# Patient Record
Sex: MALE | Race: WHITE | Employment: UNEMPLOYED | URBAN - METROPOLITAN AREA
[De-identification: names, ages, dates, MRNs, and addresses within clinical notes are randomized per-mention and may not be internally consistent; named-entity substitution may affect disease eponyms.]

---

## 2017-05-25 ENCOUNTER — OFFICE VISIT (OUTPATIENT)
Dept: FAMILY MEDICINE CLINIC | Age: 62
End: 2017-05-25

## 2017-05-25 VITALS
SYSTOLIC BLOOD PRESSURE: 120 MMHG | DIASTOLIC BLOOD PRESSURE: 80 MMHG | HEART RATE: 87 BPM | WEIGHT: 188.9 LBS | BODY MASS INDEX: 27.04 KG/M2 | HEIGHT: 70 IN | OXYGEN SATURATION: 97 %

## 2017-05-25 DIAGNOSIS — R89.9 ABNORMAL LABORATORY TEST: ICD-10-CM

## 2017-05-25 DIAGNOSIS — Z01.818 PRE-OP EXAM: Primary | ICD-10-CM

## 2017-05-25 DIAGNOSIS — H02.403 ACQUIRED PTOSIS OF BOTH EYELIDS: ICD-10-CM

## 2017-05-25 PROCEDURE — 99244 OFF/OP CNSLTJ NEW/EST MOD 40: CPT | Performed by: FAMILY MEDICINE

## 2017-05-26 LAB
ALBUMIN SERPL-MCNC: 4.2 G/DL (ref 3.4–5)
ALP BLD-CCNC: 83 U/L (ref 40–129)
ALT SERPL-CCNC: 27 U/L (ref 10–40)
AST SERPL-CCNC: 33 U/L (ref 15–37)
BILIRUB SERPL-MCNC: <0.2 MG/DL (ref 0–1)
BILIRUBIN DIRECT: <0.2 MG/DL (ref 0–0.3)
BILIRUBIN, INDIRECT: NORMAL MG/DL (ref 0–1)
CHOLESTEROL, TOTAL: 190 MG/DL (ref 0–199)
HDLC SERPL-MCNC: 65 MG/DL (ref 40–60)
LDL CHOLESTEROL CALCULATED: 109 MG/DL
TOTAL PROTEIN: 7.3 G/DL (ref 6.4–8.2)
TRIGL SERPL-MCNC: 81 MG/DL (ref 0–150)
VLDLC SERPL CALC-MCNC: 16 MG/DL

## 2018-01-15 ENCOUNTER — OFFICE VISIT (OUTPATIENT)
Dept: FAMILY MEDICINE CLINIC | Age: 63
End: 2018-01-15

## 2018-01-15 VITALS
OXYGEN SATURATION: 98 % | SYSTOLIC BLOOD PRESSURE: 120 MMHG | BODY MASS INDEX: 28.43 KG/M2 | HEIGHT: 70 IN | WEIGHT: 198.6 LBS | DIASTOLIC BLOOD PRESSURE: 80 MMHG | HEART RATE: 92 BPM

## 2018-01-15 DIAGNOSIS — M25.541 ARTHRALGIA OF BOTH HANDS: ICD-10-CM

## 2018-01-15 DIAGNOSIS — M25.542 ARTHRALGIA OF BOTH HANDS: ICD-10-CM

## 2018-01-15 PROBLEM — Z01.818 PRE-OP EXAM: Status: RESOLVED | Noted: 2017-05-25 | Resolved: 2018-01-15

## 2018-01-15 PROCEDURE — 99213 OFFICE O/P EST LOW 20 MIN: CPT | Performed by: FAMILY MEDICINE

## 2018-01-15 RX ORDER — GLUCOSAMINE/CHONDR SU A SOD 750-600 MG
TABLET ORAL
COMMUNITY
End: 2021-01-13 | Stop reason: ALTCHOICE

## 2018-01-15 ASSESSMENT — PATIENT HEALTH QUESTIONNAIRE - PHQ9
SUM OF ALL RESPONSES TO PHQ9 QUESTIONS 1 & 2: 0
2. FEELING DOWN, DEPRESSED OR HOPELESS: 0
1. LITTLE INTEREST OR PLEASURE IN DOING THINGS: 0
SUM OF ALL RESPONSES TO PHQ QUESTIONS 1-9: 0

## 2018-01-15 NOTE — PROGRESS NOTES
Subjective:      Patient ID: Nando Valdes is a 60 y.o. male. Hand Pain    The incident occurred more than 1 week ago. The injury mechanism was repetitive motion. The pain is present in the right fingers and left fingers. The quality of the pain is described as aching. The pain does not radiate. The pain is mild. The pain has been fluctuating since the incident. Pertinent negatives include no chest pain, muscle weakness, numbness or tingling. Associated symptoms comments: +FH of SLE  . Nothing aggravates the symptoms. He has tried nothing for the symptoms. The treatment provided no relief. Review of Systems   Cardiovascular: Negative for chest pain. Musculoskeletal: Positive for arthralgias (foot pain too). Neurological: Negative for tingling and numbness. Objective:   Physical Exam   Constitutional: He is oriented to person, place, and time. He appears well-developed and well-nourished. No distress. HENT:   Mouth/Throat: Oropharynx is clear and moist.   Eyes: Conjunctivae are normal. Pupils are equal, round, and reactive to light. Neck: No JVD present. No tracheal deviation present. No thyromegaly present. Cardiovascular: Normal rate, regular rhythm, normal heart sounds and intact distal pulses. Exam reveals no gallop. No murmur heard. Pulmonary/Chest: Effort normal and breath sounds normal. No stridor. No respiratory distress. He has no wheezes. He has no rales. He exhibits no tenderness. Abdominal: Soft. Bowel sounds are normal. He exhibits no distension and no mass. There is no tenderness. Musculoskeletal: He exhibits no edema or tenderness. Lymphadenopathy:     He has no cervical adenopathy. Neurological: He is alert and oriented to person, place, and time. He displays normal reflexes. No cranial nerve deficit. He exhibits normal muscle tone. Coordination normal.   Skin: Skin is warm and dry. No rash noted. No erythema. No pallor.    Psychiatric: He has a normal mood and affect. His behavior is normal. Judgment and thought content normal.   Vitals reviewed.       Assessment:      Arthralgia of both hands  Will get labs          Plan:      Above will be done at [facility]

## 2018-03-20 ENCOUNTER — OFFICE VISIT (OUTPATIENT)
Dept: FAMILY MEDICINE CLINIC | Age: 63
End: 2018-03-20

## 2018-03-20 VITALS
HEART RATE: 81 BPM | SYSTOLIC BLOOD PRESSURE: 120 MMHG | DIASTOLIC BLOOD PRESSURE: 80 MMHG | BODY MASS INDEX: 27.93 KG/M2 | OXYGEN SATURATION: 95 % | WEIGHT: 195.1 LBS | HEIGHT: 70 IN

## 2018-03-20 DIAGNOSIS — Z00.00 WELL ADULT EXAM: Primary | ICD-10-CM

## 2018-03-20 DIAGNOSIS — M25.542 ARTHRALGIA OF BOTH HANDS: ICD-10-CM

## 2018-03-20 DIAGNOSIS — M25.541 ARTHRALGIA OF BOTH HANDS: ICD-10-CM

## 2018-03-20 DIAGNOSIS — E55.9 VITAMIN D DEFICIENCY: ICD-10-CM

## 2018-03-20 DIAGNOSIS — Z23 NEED FOR DIPHTHERIA-TETANUS-PERTUSSIS (TDAP) VACCINE: ICD-10-CM

## 2018-03-20 PROCEDURE — 99396 PREV VISIT EST AGE 40-64: CPT | Performed by: FAMILY MEDICINE

## 2018-03-20 PROCEDURE — 90471 IMMUNIZATION ADMIN: CPT | Performed by: FAMILY MEDICINE

## 2018-03-20 PROCEDURE — 90715 TDAP VACCINE 7 YRS/> IM: CPT | Performed by: FAMILY MEDICINE

## 2018-03-20 NOTE — PROGRESS NOTES
daily.      aspirin 81 MG tablet Take 81 mg by mouth daily.  vitamin E 1000 UNITS capsule Take 1,000 Units by mouth daily.  Cholecalciferol (VITAMIN D-3) 5000 UNITS TABS Take  by mouth. Past Medical History:   Diagnosis Date    Hip dysplasia      Past Surgical History:   Procedure Laterality Date    CATARACT REMOVAL Bilateral 2006    COLONOSCOPY  2004,2014    HIP SURGERY      JOINT REPLACEMENT Right 2009    hip replacement    RETINAL DETACHMENT SURGERY Bilateral     x2     Family History   Problem Relation Age of Onset    Other Mother      SLE    Heart Disease Father     High Blood Pressure Brother     Stroke Brother     Other Brother      pheochromocytoma     Social History     Social History    Marital status:      Spouse name: N/A    Number of children: 2    Years of education: N/A     Occupational History    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 Physical Exam:   Vitals:    03/20/18 1341   BP: 120/80   Pulse: 81   SpO2: 95%   Weight: 195 lb 1.6 oz (88.5 kg)   Height: 5' 10.08\" (1.78 m)     Body mass index is 27.93 kg/m². Constitutional: He is oriented to person, place, and time. He appears well-developed and well-nourished. No distress. HEENT:   Head: Normocephalic and atraumatic.    Right Ear: Tympanic membrane, external ear and ear canal normal.   Left Ear: Tympanic membrane, external ear and ear canal normal.   Nose: Nose normal. Mouth/Throat: Oropharynx is clear and moist and mucous membranes are normal. No oropharyngeal exudate or posterior oropharyngeal erythema. He has no cervical adenopathy. Eyes: Conjunctivae and extraocular motions are normal. Pupils are equal, round, and reactive to light. Neck: Full passive range of motion without pain. Neck supple. No JVD present. Carotid bruit is not present. No mass and no thyromegaly present. Cardiovascular: Normal rate, regular rhythm, normal heart sounds and intact distal pulses. Exam reveals no gallop and no friction rub. No murmur heard. Pulmonary/Chest: Effort normal and breath sounds normal. No respiratory distress. He has no wheezes, rhonchi or rales. Abdominal: Soft, non-tender. Bowel sounds and aorta are normal. There is no organomegaly, mass or bruit. Genitourinary:  genitals normal without hernia or inguinal adenopathy, rectal normal, no masses, prostate normal in size without masses or nodules, is  circumsized, no inguinal lymphadenopathy. Musculoskeletal: Normal range of motion, no synovitis. He exhibits no edema. Neurological: He is alert and oriented to person, place, and time. He has normal reflexes. No cranial nerve deficit. Coordination normal.   Skin: Skin is warm, dry and intact. No suspicious lesions are noted. Psychiatric: He has a normal mood and affect.  His speech is normal and behavior is normal. Judgment, cognition and memory are normal.     Assessment/Plan:    Well adult exam  Labs--tdap    Arthralgia of both hands  NAIDA,RF

## 2018-03-22 DIAGNOSIS — M25.542 ARTHRALGIA OF BOTH HANDS: ICD-10-CM

## 2018-03-22 DIAGNOSIS — M25.541 ARTHRALGIA OF BOTH HANDS: ICD-10-CM

## 2018-03-22 LAB
BASOPHILS ABSOLUTE: 0 K/UL (ref 0–0.2)
BASOPHILS RELATIVE PERCENT: 0.3 %
EOSINOPHILS ABSOLUTE: 0.4 K/UL (ref 0–0.6)
EOSINOPHILS RELATIVE PERCENT: 6 %
HCT VFR BLD CALC: 43.4 % (ref 40.5–52.5)
HEMOGLOBIN: 15.1 G/DL (ref 13.5–17.5)
LYMPHOCYTES ABSOLUTE: 2.4 K/UL (ref 1–5.1)
LYMPHOCYTES RELATIVE PERCENT: 33.1 %
MCH RBC QN AUTO: 30.5 PG (ref 26–34)
MCHC RBC AUTO-ENTMCNC: 34.7 G/DL (ref 31–36)
MCV RBC AUTO: 87.8 FL (ref 80–100)
MONOCYTES ABSOLUTE: 0.8 K/UL (ref 0–1.3)
MONOCYTES RELATIVE PERCENT: 11.3 %
NEUTROPHILS ABSOLUTE: 3.6 K/UL (ref 1.7–7.7)
NEUTROPHILS RELATIVE PERCENT: 49.3 %
PDW BLD-RTO: 14.1 % (ref 12.4–15.4)
PLATELET # BLD: 234 K/UL (ref 135–450)
PMV BLD AUTO: 9 FL (ref 5–10.5)
RBC # BLD: 4.94 M/UL (ref 4.2–5.9)
WBC # BLD: 7.3 K/UL (ref 4–11)

## 2018-03-23 LAB
A/G RATIO: 1.6 (ref 1.1–2.2)
ALBUMIN SERPL-MCNC: 4.7 G/DL (ref 3.4–5)
ALP BLD-CCNC: 73 U/L (ref 40–129)
ALT SERPL-CCNC: 36 U/L (ref 10–40)
ANA INTERPRETATION: ABNORMAL
ANA PATTERN: ABNORMAL
ANA TITER: ABNORMAL
ANION GAP SERPL CALCULATED.3IONS-SCNC: 20 MMOL/L (ref 3–16)
ANTI-NUCLEAR ANTIBODY (ANA): POSITIVE
AST SERPL-CCNC: 36 U/L (ref 15–37)
BILIRUB SERPL-MCNC: 0.5 MG/DL (ref 0–1)
BUN BLDV-MCNC: 23 MG/DL (ref 7–20)
CALCIUM SERPL-MCNC: 9.3 MG/DL (ref 8.3–10.6)
CHLORIDE BLD-SCNC: 100 MMOL/L (ref 99–110)
CHOLESTEROL, TOTAL: 188 MG/DL (ref 0–199)
CO2: 23 MMOL/L (ref 21–32)
CREAT SERPL-MCNC: 1 MG/DL (ref 0.8–1.3)
GFR AFRICAN AMERICAN: >60
GFR NON-AFRICAN AMERICAN: >60
GLOBULIN: 3 G/DL
GLUCOSE BLD-MCNC: 98 MG/DL (ref 70–99)
HDLC SERPL-MCNC: 68 MG/DL (ref 40–60)
LDL CHOLESTEROL CALCULATED: 106 MG/DL
POTASSIUM SERPL-SCNC: 4.8 MMOL/L (ref 3.5–5.1)
RHEUMATOID FACTOR: <10 IU/ML
SODIUM BLD-SCNC: 143 MMOL/L (ref 136–145)
TOTAL PROTEIN: 7.7 G/DL (ref 6.4–8.2)
TRIGL SERPL-MCNC: 69 MG/DL (ref 0–150)
TSH SERPL DL<=0.05 MIU/L-ACNC: 3.36 UIU/ML (ref 0.27–4.2)
VITAMIN D 25-HYDROXY: 60.1 NG/ML
VLDLC SERPL CALC-MCNC: 14 MG/DL

## 2018-09-26 ENCOUNTER — TELEPHONE (OUTPATIENT)
Dept: FAMILY MEDICINE CLINIC | Age: 63
End: 2018-09-26

## 2018-09-26 DIAGNOSIS — M25.541 ARTHRALGIA OF BOTH HANDS: Primary | ICD-10-CM

## 2018-09-26 DIAGNOSIS — M25.542 ARTHRALGIA OF BOTH HANDS: Primary | ICD-10-CM

## 2018-09-27 LAB
ANA INTERPRETATION: ABNORMAL
ANA PATTERN: ABNORMAL
ANA TITER: ABNORMAL
ANTI-NUCLEAR ANTIBODY (ANA): POSITIVE

## 2019-08-19 ENCOUNTER — OFFICE VISIT (OUTPATIENT)
Dept: FAMILY MEDICINE CLINIC | Age: 64
End: 2019-08-19
Payer: COMMERCIAL

## 2019-08-19 VITALS
OXYGEN SATURATION: 98 % | HEART RATE: 91 BPM | BODY MASS INDEX: 27.63 KG/M2 | WEIGHT: 193 LBS | DIASTOLIC BLOOD PRESSURE: 90 MMHG | SYSTOLIC BLOOD PRESSURE: 136 MMHG | HEIGHT: 70 IN

## 2019-08-19 DIAGNOSIS — R03.0 ELEVATED BLOOD PRESSURE READING: ICD-10-CM

## 2019-08-19 DIAGNOSIS — Z00.00 WELL ADULT EXAM: Primary | ICD-10-CM

## 2019-08-19 DIAGNOSIS — Z23 NEED FOR PNEUMOCOCCAL VACCINE: ICD-10-CM

## 2019-08-19 DIAGNOSIS — E55.9 VITAMIN D DEFICIENCY: ICD-10-CM

## 2019-08-19 PROCEDURE — 90670 PCV13 VACCINE IM: CPT | Performed by: FAMILY MEDICINE

## 2019-08-19 PROCEDURE — 99396 PREV VISIT EST AGE 40-64: CPT | Performed by: FAMILY MEDICINE

## 2019-08-19 PROCEDURE — 90471 IMMUNIZATION ADMIN: CPT | Performed by: FAMILY MEDICINE

## 2019-08-19 ASSESSMENT — PATIENT HEALTH QUESTIONNAIRE - PHQ9
SUM OF ALL RESPONSES TO PHQ QUESTIONS 1-9: 0
SUM OF ALL RESPONSES TO PHQ9 QUESTIONS 1 & 2: 0
SUM OF ALL RESPONSES TO PHQ QUESTIONS 1-9: 0
1. LITTLE INTEREST OR PLEASURE IN DOING THINGS: 0
2. FEELING DOWN, DEPRESSED OR HOPELESS: 0

## 2019-08-19 NOTE — PROGRESS NOTES
History and Physical      Mary Dumont  YOB: 1955    Date of Service:  8/19/2019    Chief Complaint:   Carolyn Landon is a 58 y.o. male who presents for complete physical examination. HPI: cpx no cc    Wt Readings from Last 3 Encounters:   08/19/19 193 lb (87.5 kg)   03/20/18 195 lb 1.6 oz (88.5 kg)   01/15/18 198 lb 9.6 oz (90.1 kg)     BP Readings from Last 3 Encounters:   08/19/19 (!) 136/90   03/20/18 120/80   01/15/18 120/80       Patient Active Problem List   Diagnosis    Neck pain on left side    Elevated blood pressure reading    Acquired ptosis of both eyelids    Arthralgia of both hands       Preventive Care:  Health Maintenance   Topic Date Due    Shingles Vaccine (2 of 3) 01/28/2015    Flu vaccine (1) 09/01/2019    Lipid screen  03/22/2023    Colon cancer screen colonoscopy  06/10/2024    DTaP/Tdap/Td vaccine (3 - Td) 03/20/2028    Hepatitis C screen  Completed    Pneumococcal 0-64 years Vaccine  Aged Out    HIV screen  Discontinued      Self-testicular exams: Yes  Sexual activity: single partner, contraception - none   Last eye exam: 2018, normal  Exercise: aerobics 4 time(s) per week  Seatbelt use: +  Lipid panel:    Lab Results   Component Value Date    CHOL 188 03/22/2018    TRIG 69 03/22/2018    HDL 68 (H) 03/22/2018    LDLCALC 106 (H) 03/22/2018       Living will: yes,   copy on file    Immunization History   Administered Date(s) Administered    Influenza Virus Vaccine 11/12/2014    Tdap (Boostrix, Adacel) 09/26/2008, 03/20/2018    Zoster Live (Zostavax) 12/03/2014       No Known Allergies  Outpatient Medications Marked as Taking for the 8/19/19 encounter (Office Visit) with Deanna White MD   Medication Sig Dispense Refill    TURMERIC CURCUMIN PO Take by mouth      Ascorbic Acid (VITAMIN C) 500 MG tablet Take 500 mg by mouth daily      multivitamin (THERAGRAN) per tablet Take 1 tablet by mouth daily.       vitamin E 1000 UNITS capsule Take 1,000

## 2019-08-23 DIAGNOSIS — Z00.00 WELL ADULT EXAM: ICD-10-CM

## 2019-08-23 DIAGNOSIS — E55.9 VITAMIN D DEFICIENCY: ICD-10-CM

## 2019-08-23 DIAGNOSIS — R73.9 HYPERGLYCEMIA: Primary | ICD-10-CM

## 2019-08-23 LAB
BASOPHILS ABSOLUTE: 0 K/UL (ref 0–0.2)
BASOPHILS RELATIVE PERCENT: 0.4 %
EOSINOPHILS ABSOLUTE: 0.3 K/UL (ref 0–0.6)
EOSINOPHILS RELATIVE PERCENT: 5.6 %
HCT VFR BLD CALC: 43.1 % (ref 40.5–52.5)
HEMOGLOBIN: 14.6 G/DL (ref 13.5–17.5)
LYMPHOCYTES ABSOLUTE: 1.9 K/UL (ref 1–5.1)
LYMPHOCYTES RELATIVE PERCENT: 31.5 %
MCH RBC QN AUTO: 29.9 PG (ref 26–34)
MCHC RBC AUTO-ENTMCNC: 33.9 G/DL (ref 31–36)
MCV RBC AUTO: 88.1 FL (ref 80–100)
MONOCYTES ABSOLUTE: 0.7 K/UL (ref 0–1.3)
MONOCYTES RELATIVE PERCENT: 11.3 %
NEUTROPHILS ABSOLUTE: 3.1 K/UL (ref 1.7–7.7)
NEUTROPHILS RELATIVE PERCENT: 51.2 %
PDW BLD-RTO: 14 % (ref 12.4–15.4)
PLATELET # BLD: 213 K/UL (ref 135–450)
PMV BLD AUTO: 8.6 FL (ref 5–10.5)
RBC # BLD: 4.89 M/UL (ref 4.2–5.9)
WBC # BLD: 6 K/UL (ref 4–11)

## 2019-08-24 LAB
A/G RATIO: 1.3 (ref 1.1–2.2)
ALBUMIN SERPL-MCNC: 4.1 G/DL (ref 3.4–5)
ALP BLD-CCNC: 82 U/L (ref 40–129)
ALT SERPL-CCNC: 26 U/L (ref 10–40)
ANION GAP SERPL CALCULATED.3IONS-SCNC: 15 MMOL/L (ref 3–16)
AST SERPL-CCNC: 29 U/L (ref 15–37)
BILIRUB SERPL-MCNC: 0.3 MG/DL (ref 0–1)
BUN BLDV-MCNC: 25 MG/DL (ref 7–20)
CALCIUM SERPL-MCNC: 9.6 MG/DL (ref 8.3–10.6)
CHLORIDE BLD-SCNC: 102 MMOL/L (ref 99–110)
CHOLESTEROL, TOTAL: 191 MG/DL (ref 0–199)
CO2: 24 MMOL/L (ref 21–32)
CREAT SERPL-MCNC: 1.1 MG/DL (ref 0.8–1.3)
GFR AFRICAN AMERICAN: >60
GFR NON-AFRICAN AMERICAN: >60
GLOBULIN: 3.2 G/DL
GLUCOSE BLD-MCNC: 107 MG/DL (ref 70–99)
HDLC SERPL-MCNC: 55 MG/DL (ref 40–60)
LDL CHOLESTEROL CALCULATED: 119 MG/DL
POTASSIUM SERPL-SCNC: 4.8 MMOL/L (ref 3.5–5.1)
SODIUM BLD-SCNC: 141 MMOL/L (ref 136–145)
TOTAL PROTEIN: 7.3 G/DL (ref 6.4–8.2)
TRIGL SERPL-MCNC: 85 MG/DL (ref 0–150)
TSH SERPL DL<=0.05 MIU/L-ACNC: 2.87 UIU/ML (ref 0.27–4.2)
VITAMIN D 25-HYDROXY: 51.8 NG/ML
VLDLC SERPL CALC-MCNC: 17 MG/DL

## 2019-08-27 DIAGNOSIS — R73.9 HYPERGLYCEMIA: ICD-10-CM

## 2019-08-27 LAB
ESTIMATED AVERAGE GLUCOSE: 114 MG/DL
HBA1C MFR BLD: 5.6 %

## 2019-09-05 ENCOUNTER — OFFICE VISIT (OUTPATIENT)
Dept: FAMILY MEDICINE CLINIC | Age: 64
End: 2019-09-05
Payer: COMMERCIAL

## 2019-09-05 VITALS — SYSTOLIC BLOOD PRESSURE: 140 MMHG | DIASTOLIC BLOOD PRESSURE: 90 MMHG | OXYGEN SATURATION: 99 % | HEART RATE: 66 BPM

## 2019-09-05 DIAGNOSIS — R03.0 ELEVATED BLOOD PRESSURE READING: ICD-10-CM

## 2019-09-05 PROCEDURE — 99213 OFFICE O/P EST LOW 20 MIN: CPT | Performed by: FAMILY MEDICINE

## 2019-09-05 PROCEDURE — G8419 CALC BMI OUT NRM PARAM NOF/U: HCPCS | Performed by: FAMILY MEDICINE

## 2019-09-05 PROCEDURE — G8427 DOCREV CUR MEDS BY ELIG CLIN: HCPCS | Performed by: FAMILY MEDICINE

## 2019-09-05 PROCEDURE — 3017F COLORECTAL CA SCREEN DOC REV: CPT | Performed by: FAMILY MEDICINE

## 2019-09-05 PROCEDURE — 1036F TOBACCO NON-USER: CPT | Performed by: FAMILY MEDICINE

## 2019-09-05 ASSESSMENT — ENCOUNTER SYMPTOMS
ORTHOPNEA: 0
BLURRED VISION: 0
SHORTNESS OF BREATH: 0

## 2019-09-05 NOTE — PROGRESS NOTES
History:   Procedure Laterality Date    CATARACT REMOVAL Bilateral 2006    COLONOSCOPY  2004,2014    HIP SURGERY      JOINT REPLACEMENT Right 2009    hip replacement    RETINAL DETACHMENT SURGERY Bilateral     x2       Social History     Socioeconomic History    Marital status:      Spouse name: Not on file    Number of children: 2    Years of education: Not on file    Highest education level: Not on file   Occupational History    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 [employer]   Social Needs    Financial resource strain: Not on file    Food insecurity:     Worry: Not on file     Inability: Not on file    Transportation needs:     Medical: Not on file     Non-medical: Not on file   Tobacco Use    Smoking status: Never Smoker    Smokeless tobacco: Never Used   Substance and Sexual Activity    Alcohol use:  Yes     Alcohol/week: 2.0 standard drinks     Types: 1 Glasses of wine, 10 Cans of beer per week    Drug use: No    Sexual activity: Yes     Partners: Female   Lifestyle    Physical activity:     Days per week: Not on file     Minutes per session: Not on file    Stress: Not on file   Relationships    Social connections:     Talks on phone: Not on file     Gets together: Not on file     Attends Pentecostal service: Not on file     Active member of club or organization: Not on file     Attends meetings of clubs or organizations: Not on file     Relationship status: Not on file    Intimate partner violence:     Fear of current or ex partner: Not on file     Emotionally abused: Not on file     Physically abused: Not on file     Forced sexual activity: Not on file   Other Topics Concern    Not on file   Social History Narrative    Retired--does patient play     remarried and doing well--kids close(has grandchildren)    Exercise daily and golfs       Family History   Problem Relation Age of Onset    Other Mother         SLE    Lupus Mother     Heart Disease Father     High Blood Pressure Brother     Stroke Brother     Other Brother         pheochromocytoma       Immunization History   Administered Date(s) Administered    Influenza Virus Vaccine 11/12/2014    Pneumococcal Conjugate 13-valent (Murtazablake Simmonsing) 08/19/2019    Tdap (Boostrix, Adacel) 09/26/2008, 03/20/2018    Zoster Live (Zostavax) 12/03/2014       Review of Systems  Review of Systems   Constitutional: Negative for malaise/fatigue. Eyes: Negative for blurred vision. Respiratory: Negative for shortness of breath. Cardiovascular: Negative for chest pain, palpitations, orthopnea and PND. Musculoskeletal: Negative for neck pain. Neurological: Negative for headaches. Objective:   Physical Exam  Physical Exam   Constitutional: He is oriented to person, place, and time. He appears well-developed and well-nourished. No distress. HENT:   Head: Normocephalic and atraumatic. Right Ear: External ear normal.   Left Ear: External ear normal.   Nose: Nose normal.   Mouth/Throat: Oropharynx is clear and moist. No oropharyngeal exudate. Eyes: Pupils are equal, round, and reactive to light. Conjunctivae and EOM are normal. Right eye exhibits no discharge. Left eye exhibits no discharge. No scleral icterus. Neck: Normal range of motion. Neck supple. No JVD present. No tracheal deviation present. No thyromegaly present. Cardiovascular: Normal rate, regular rhythm, normal heart sounds and intact distal pulses. Exam reveals no gallop. No murmur heard. Pulmonary/Chest: Effort normal and breath sounds normal. No respiratory distress. He has no wheezes. He has no rales. He exhibits no tenderness. Abdominal: Soft. Bowel sounds are normal. He exhibits no distension and no mass. There is no tenderness. There is no rebound and no guarding. Genitourinary: Rectum normal, prostate normal and penis normal. Rectal exam shows guaiac negative stool. No penile tenderness. Musculoskeletal: He exhibits no edema or tenderness.

## 2019-11-01 ENCOUNTER — TELEPHONE (OUTPATIENT)
Dept: FAMILY MEDICINE CLINIC | Age: 64
End: 2019-11-01

## 2019-11-01 DIAGNOSIS — R73.9 HYPERGLYCEMIA: Primary | ICD-10-CM

## 2019-11-12 DIAGNOSIS — R73.9 HYPERGLYCEMIA: ICD-10-CM

## 2019-11-12 LAB
ANION GAP SERPL CALCULATED.3IONS-SCNC: 16 MMOL/L (ref 3–16)
BUN BLDV-MCNC: 22 MG/DL (ref 7–20)
CALCIUM SERPL-MCNC: 9.2 MG/DL (ref 8.3–10.6)
CHLORIDE BLD-SCNC: 101 MMOL/L (ref 99–110)
CO2: 23 MMOL/L (ref 21–32)
CREAT SERPL-MCNC: 1.1 MG/DL (ref 0.8–1.3)
GFR AFRICAN AMERICAN: >60
GFR NON-AFRICAN AMERICAN: >60
GLUCOSE BLD-MCNC: 108 MG/DL (ref 70–99)
POTASSIUM SERPL-SCNC: 4.5 MMOL/L (ref 3.5–5.1)
SODIUM BLD-SCNC: 140 MMOL/L (ref 136–145)

## 2019-11-13 LAB
ESTIMATED AVERAGE GLUCOSE: 111.2 MG/DL
HBA1C MFR BLD: 5.5 %

## 2019-12-18 ENCOUNTER — APPOINTMENT (RX ONLY)
Dept: URBAN - METROPOLITAN AREA CLINIC 170 | Facility: CLINIC | Age: 64
Setting detail: DERMATOLOGY
End: 2019-12-18

## 2019-12-18 DIAGNOSIS — L663 OTHER SPECIFIED DISEASES OF HAIR AND HAIR FOLLICLES: ICD-10-CM

## 2019-12-18 DIAGNOSIS — L738 OTHER SPECIFIED DISEASES OF HAIR AND HAIR FOLLICLES: ICD-10-CM

## 2019-12-18 DIAGNOSIS — L73.9 FOLLICULAR DISORDER, UNSPECIFIED: ICD-10-CM

## 2019-12-18 PROBLEM — L02.12 FURUNCLE OF NECK: Status: ACTIVE | Noted: 2019-12-18

## 2019-12-18 PROCEDURE — ? ADDITIONAL NOTES

## 2019-12-18 PROCEDURE — ? COUNSELING

## 2019-12-18 PROCEDURE — ? PRESCRIPTION

## 2019-12-18 PROCEDURE — 99213 OFFICE O/P EST LOW 20 MIN: CPT

## 2019-12-18 RX ORDER — CLINDAMYCIN PHOSPHATE 10 MG/G
GEL TOPICAL
Qty: 1 | Refills: 2 | Status: ERX | COMMUNITY
Start: 2019-12-18

## 2019-12-18 RX ADMIN — CLINDAMYCIN PHOSPHATE ONE: 10 GEL TOPICAL at 00:00

## 2019-12-18 ASSESSMENT — LOCATION DETAILED DESCRIPTION DERM: LOCATION DETAILED: MID POSTERIOR NECK

## 2019-12-18 ASSESSMENT — LOCATION ZONE DERM: LOCATION ZONE: NECK

## 2019-12-18 ASSESSMENT — LOCATION SIMPLE DESCRIPTION DERM: LOCATION SIMPLE: POSTERIOR NECK

## 2019-12-18 NOTE — HPI: SKIN LESIONS
How Severe Is Your Skin Lesion?: mild
Have Your Skin Lesions Been Treated?: not been treated
Is This A New Presentation, Or A Follow-Up?: Growth
Additional History: Eval lump in the back of the neck, it has been there for a few months and only gets irritated when he picks it.

## 2020-10-29 ENCOUNTER — APPOINTMENT (RX ONLY)
Dept: URBAN - METROPOLITAN AREA CLINIC 170 | Facility: CLINIC | Age: 65
Setting detail: DERMATOLOGY
End: 2020-10-29

## 2020-10-29 DIAGNOSIS — L738 OTHER SPECIFIED DISEASES OF HAIR AND HAIR FOLLICLES: ICD-10-CM

## 2020-10-29 DIAGNOSIS — L81.4 OTHER MELANIN HYPERPIGMENTATION: ICD-10-CM

## 2020-10-29 DIAGNOSIS — D22 MELANOCYTIC NEVI: ICD-10-CM

## 2020-10-29 DIAGNOSIS — L82.1 OTHER SEBORRHEIC KERATOSIS: ICD-10-CM

## 2020-10-29 DIAGNOSIS — L663 OTHER SPECIFIED DISEASES OF HAIR AND HAIR FOLLICLES: ICD-10-CM

## 2020-10-29 DIAGNOSIS — L73.9 FOLLICULAR DISORDER, UNSPECIFIED: ICD-10-CM

## 2020-10-29 DIAGNOSIS — D18.0 HEMANGIOMA: ICD-10-CM

## 2020-10-29 DIAGNOSIS — L57.0 ACTINIC KERATOSIS: ICD-10-CM

## 2020-10-29 PROBLEM — D22.5 MELANOCYTIC NEVI OF TRUNK: Status: ACTIVE | Noted: 2020-10-29

## 2020-10-29 PROBLEM — L02.821 FURUNCLE OF HEAD [ANY PART, EXCEPT FACE]: Status: ACTIVE | Noted: 2020-10-29

## 2020-10-29 PROBLEM — D18.01 HEMANGIOMA OF SKIN AND SUBCUTANEOUS TISSUE: Status: ACTIVE | Noted: 2020-10-29

## 2020-10-29 PROCEDURE — 17000 DESTRUCT PREMALG LESION: CPT

## 2020-10-29 PROCEDURE — ? FULL BODY SKIN EXAM

## 2020-10-29 PROCEDURE — ? SUNSCREEN RECOMMENDATIONS

## 2020-10-29 PROCEDURE — ? PRESCRIPTION MEDICATION MANAGEMENT

## 2020-10-29 PROCEDURE — ? LIQUID NITROGEN

## 2020-10-29 PROCEDURE — ? ADDITIONAL NOTES

## 2020-10-29 PROCEDURE — ? COUNSELING

## 2020-10-29 PROCEDURE — 99214 OFFICE O/P EST MOD 30 MIN: CPT | Mod: 25

## 2020-10-29 ASSESSMENT — LOCATION SIMPLE DESCRIPTION DERM
LOCATION SIMPLE: RIGHT LOWER BACK
LOCATION SIMPLE: UPPER BACK
LOCATION SIMPLE: POSTERIOR SCALP
LOCATION SIMPLE: RIGHT SHOULDER
LOCATION SIMPLE: ABDOMEN
LOCATION SIMPLE: LEFT FOREHEAD

## 2020-10-29 ASSESSMENT — LOCATION DETAILED DESCRIPTION DERM
LOCATION DETAILED: LEFT INFERIOR MEDIAL FOREHEAD
LOCATION DETAILED: RIGHT ANTERIOR SHOULDER
LOCATION DETAILED: RIGHT INFERIOR MEDIAL MIDBACK
LOCATION DETAILED: MID-OCCIPITAL SCALP
LOCATION DETAILED: EPIGASTRIC SKIN
LOCATION DETAILED: INFERIOR THORACIC SPINE

## 2020-10-29 ASSESSMENT — LOCATION ZONE DERM
LOCATION ZONE: ARM
LOCATION ZONE: FACE
LOCATION ZONE: SCALP
LOCATION ZONE: TRUNK

## 2020-10-29 NOTE — HPI: EVALUATION OF SKIN LESION(S)
What Type Of Note Output Would You Prefer (Optional)?: Bullet Format
Hpi Title: Evaluation of Skin Lesions
How Severe Are Your Spot(S)?: mild
Have Your Spot(S) Been Treated In The Past?: has not been treated
Additional History: Patient states one area on the back of the neck but it’s been there for years. Used Clindamycin gel once daily for a few months, never got the refill, still on the first bottle.

## 2020-10-29 NOTE — PROCEDURE: PRESCRIPTION MEDICATION MANAGEMENT
Detail Level: Detailed
Initiate Treatment: Get RF and restart Clindamycin gel 1% BID
Render In Strict Bullet Format?: No

## 2020-10-29 NOTE — PROCEDURE: ADDITIONAL NOTES
Detail Level: Simple
Additional Notes: Patient consent was obtained to proceed with the visit and recommended plan of care after discussion of all risks and benefits, including the risks of COVID-19 exposure.
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 Pt instructed to stop picking.
Detail Level: Detailed

## 2020-12-17 ENCOUNTER — OFFICE VISIT (OUTPATIENT)
Dept: PRIMARY CARE CLINIC | Age: 65
End: 2020-12-17
Payer: COMMERCIAL

## 2020-12-17 PROCEDURE — G8421 BMI NOT CALCULATED: HCPCS | Performed by: NURSE PRACTITIONER

## 2020-12-17 PROCEDURE — 99211 OFF/OP EST MAY X REQ PHY/QHP: CPT | Performed by: NURSE PRACTITIONER

## 2020-12-17 PROCEDURE — G8428 CUR MEDS NOT DOCUMENT: HCPCS | Performed by: NURSE PRACTITIONER

## 2020-12-17 NOTE — PROGRESS NOTES
Xochitl Yañez 59 received a viral test for COVID-19. They were educated on isolation and quarantine as appropriate. For any symptoms, they were directed to seek care from their PCP, given contact information to establish with a doctor, directed to an urgent care or the emergency room.

## 2020-12-18 ENCOUNTER — TELEPHONE (OUTPATIENT)
Dept: FAMILY MEDICINE CLINIC | Age: 65
End: 2020-12-18

## 2020-12-18 LAB — SARS-COV-2: DETECTED

## 2021-01-13 ENCOUNTER — OFFICE VISIT (OUTPATIENT)
Dept: FAMILY MEDICINE CLINIC | Age: 66
End: 2021-01-13
Payer: COMMERCIAL

## 2021-01-13 VITALS
BODY MASS INDEX: 27.63 KG/M2 | WEIGHT: 193 LBS | OXYGEN SATURATION: 98 % | HEIGHT: 70 IN | TEMPERATURE: 98.2 F | HEART RATE: 94 BPM | SYSTOLIC BLOOD PRESSURE: 120 MMHG | DIASTOLIC BLOOD PRESSURE: 80 MMHG

## 2021-01-13 DIAGNOSIS — M17.10 ARTHRITIS OF KNEE: ICD-10-CM

## 2021-01-13 DIAGNOSIS — U07.1 COVID-19 VIRUS INFECTION: Primary | ICD-10-CM

## 2021-01-13 DIAGNOSIS — R03.0 ELEVATED BLOOD PRESSURE READING: ICD-10-CM

## 2021-01-13 DIAGNOSIS — Z00.00 WELL ADULT EXAM: ICD-10-CM

## 2021-01-13 LAB
A/G RATIO: 1.2 (ref 1.1–2.2)
ALBUMIN SERPL-MCNC: 4.1 G/DL (ref 3.4–5)
ALP BLD-CCNC: 81 U/L (ref 40–129)
ALT SERPL-CCNC: 24 U/L (ref 10–40)
ANION GAP SERPL CALCULATED.3IONS-SCNC: 9 MMOL/L (ref 3–16)
AST SERPL-CCNC: 27 U/L (ref 15–37)
BASOPHILS ABSOLUTE: 0 K/UL (ref 0–0.2)
BASOPHILS RELATIVE PERCENT: 0.3 %
BILIRUB SERPL-MCNC: 0.4 MG/DL (ref 0–1)
BUN BLDV-MCNC: 18 MG/DL (ref 7–20)
CALCIUM SERPL-MCNC: 9.6 MG/DL (ref 8.3–10.6)
CHLORIDE BLD-SCNC: 101 MMOL/L (ref 99–110)
CHOLESTEROL, TOTAL: 196 MG/DL (ref 0–199)
CO2: 28 MMOL/L (ref 21–32)
CREAT SERPL-MCNC: 1 MG/DL (ref 0.8–1.3)
EOSINOPHILS ABSOLUTE: 0.1 K/UL (ref 0–0.6)
EOSINOPHILS RELATIVE PERCENT: 2.3 %
GFR AFRICAN AMERICAN: >60
GFR NON-AFRICAN AMERICAN: >60
GLOBULIN: 3.3 G/DL
GLUCOSE BLD-MCNC: 99 MG/DL (ref 70–99)
HCT VFR BLD CALC: 42.2 % (ref 40.5–52.5)
HDLC SERPL-MCNC: 61 MG/DL (ref 40–60)
HEMOGLOBIN: 14 G/DL (ref 13.5–17.5)
LDL CHOLESTEROL CALCULATED: 127 MG/DL
LYMPHOCYTES ABSOLUTE: 1.7 K/UL (ref 1–5.1)
LYMPHOCYTES RELATIVE PERCENT: 35.6 %
MCH RBC QN AUTO: 29.3 PG (ref 26–34)
MCHC RBC AUTO-ENTMCNC: 33.3 G/DL (ref 31–36)
MCV RBC AUTO: 88 FL (ref 80–100)
MONOCYTES ABSOLUTE: 0.6 K/UL (ref 0–1.3)
MONOCYTES RELATIVE PERCENT: 13 %
NEUTROPHILS ABSOLUTE: 2.3 K/UL (ref 1.7–7.7)
NEUTROPHILS RELATIVE PERCENT: 48.8 %
PDW BLD-RTO: 14 % (ref 12.4–15.4)
PLATELET # BLD: 232 K/UL (ref 135–450)
PMV BLD AUTO: 9.1 FL (ref 5–10.5)
POTASSIUM SERPL-SCNC: 4.4 MMOL/L (ref 3.5–5.1)
RBC # BLD: 4.79 M/UL (ref 4.2–5.9)
SODIUM BLD-SCNC: 138 MMOL/L (ref 136–145)
TOTAL PROTEIN: 7.4 G/DL (ref 6.4–8.2)
TRIGL SERPL-MCNC: 42 MG/DL (ref 0–150)
VLDLC SERPL CALC-MCNC: 8 MG/DL
WBC # BLD: 4.7 K/UL (ref 4–11)

## 2021-01-13 PROCEDURE — 1036F TOBACCO NON-USER: CPT | Performed by: FAMILY MEDICINE

## 2021-01-13 PROCEDURE — G8484 FLU IMMUNIZE NO ADMIN: HCPCS | Performed by: FAMILY MEDICINE

## 2021-01-13 PROCEDURE — 99213 OFFICE O/P EST LOW 20 MIN: CPT | Performed by: FAMILY MEDICINE

## 2021-01-13 PROCEDURE — 3017F COLORECTAL CA SCREEN DOC REV: CPT | Performed by: FAMILY MEDICINE

## 2021-01-13 PROCEDURE — G8427 DOCREV CUR MEDS BY ELIG CLIN: HCPCS | Performed by: FAMILY MEDICINE

## 2021-01-13 PROCEDURE — 4040F PNEUMOC VAC/ADMIN/RCVD: CPT | Performed by: FAMILY MEDICINE

## 2021-01-13 PROCEDURE — 1123F ACP DISCUSS/DSCN MKR DOCD: CPT | Performed by: FAMILY MEDICINE

## 2021-01-13 PROCEDURE — G8417 CALC BMI ABV UP PARAM F/U: HCPCS | Performed by: FAMILY MEDICINE

## 2021-01-13 SDOH — ECONOMIC STABILITY: FOOD INSECURITY: WITHIN THE PAST 12 MONTHS, YOU WORRIED THAT YOUR FOOD WOULD RUN OUT BEFORE YOU GOT MONEY TO BUY MORE.: NEVER TRUE

## 2021-01-13 ASSESSMENT — PATIENT HEALTH QUESTIONNAIRE - PHQ9
SUM OF ALL RESPONSES TO PHQ9 QUESTIONS 1 & 2: 0
1. LITTLE INTEREST OR PLEASURE IN DOING THINGS: 0
2. FEELING DOWN, DEPRESSED OR HOPELESS: 0
SUM OF ALL RESPONSES TO PHQ QUESTIONS 1-9: 0

## 2021-01-13 NOTE — PROGRESS NOTES
Subjective:     Patient ID:Patrick Escobedo is a 76 y.o. male. Knee Pain   The incident occurred more than 1 week ago. The incident occurred at home. There was no injury mechanism. The pain is present in the right knee. The quality of the pain is described as aching. The pain is mild. The pain has been intermittent since onset. Pertinent negatives include no inability to bear weight, loss of motion, loss of sensation, muscle weakness, numbness or tingling. He reports no foreign bodies present. Nothing aggravates the symptoms. He has tried nothing for the symptoms. The treatment provided moderate relief. No Known Allergies    Current Outpatient Medications   Medication Sig Dispense Refill    TURMERIC CURCUMIN PO Take by mouth      KRILL OIL PO Take by mouth      Ascorbic Acid (VITAMIN C) 500 MG tablet Take 1,000 mg by mouth daily       multivitamin (THERAGRAN) per tablet Take 1 tablet by mouth daily.  vitamin E 1000 UNITS capsule Take 1,000 Units by mouth daily.  Cholecalciferol (VITAMIN D-3) 5000 UNITS TABS Take  by mouth. No current facility-administered medications for this visit.         Past Medical History:   Diagnosis Date    COVID-19 virus infection 1/13/2021    Hip dysplasia        Past Surgical History:   Procedure Laterality Date    CATARACT REMOVAL Bilateral 2006    COLONOSCOPY  [id number]    HIP SURGERY      JOINT REPLACEMENT Right 2009    hip replacement    RETINAL DETACHMENT SURGERY Bilateral     x2       Social History     Socioeconomic History    Marital status:      Spouse name: Not on file    Number of children: 2    Years of education: Not on file    Highest education level: Not on file   Occupational History    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 [employer]   Social Needs    Financial resource strain: Not hard at all   [place] insecurity     Worry: Never true     Inability: Never true   [employer] needs     Medical: No     Non-medical: No Tobacco Use    Smoking status: Never Smoker    Smokeless tobacco: Never Used   Substance and Sexual Activity    Alcohol use: Yes     Alcohol/week: 2.0 standard drinks     Types: 1 Glasses of wine, 10 Cans of beer per week    Drug use: No    Sexual activity: Yes     Partners: Female   Lifestyle    Physical activity     Days per week: Not on file     Minutes per session: Not on file    Stress: Not on file   Relationships    Social connections     Talks on phone: Not on file     Gets together: Not on file     Attends Orthodox service: Not on file     Active member of club or organization: Not on file     Attends meetings of clubs or organizations: Not on file     Relationship status: Not on file    Intimate partner violence     Fear of current or ex partner: Not on file     Emotionally abused: Not on file     Physically abused: Not on file     Forced sexual activity: Not on file   Other Topics Concern    Not on file   Social History Narrative    Retired--does patient play     remarried and doing well--kids close(has grandchildren)    Exercise daily and golfs       Family History   Problem Relation Age of Onset    Other Mother         SLE    Lupus Mother     Heart Disease Father     High Blood Pressure Brother     Stroke Brother     Other Brother         pheochromocytoma       Immunization History   Administered Date(s) Administered    Influenza Virus Vaccine 11/12/2014    Influenza, Quadv, adjuvanted, 65 yrs +, IM, PF (Fluad) 10/07/2020    Pneumococcal Conjugate 13-valent ([id number]) 08/19/2019    Pneumococcal Polysaccharide ([id number]) 10/07/2020    Tdap (Boostrix, Adacel) 09/26/2008, 03/20/2018    Zoster Live (Zostavax) 12/03/2014    Zoster Recombinant (Shingrix) 10/07/2020       Review of Systems  Review of Systems   Neurological: Negative for tingling and numbness. Objective:   Physical Exam  Physical Exam  Vitals signs reviewed.    Constitutional:

## 2021-02-24 ENCOUNTER — IMMUNIZATION (OUTPATIENT)
Dept: PRIMARY CARE CLINIC | Age: 66
End: 2021-02-24
Payer: COMMERCIAL

## 2021-02-24 PROCEDURE — 0011A COVID-19, MODERNA VACCINE 100MCG/0.5ML DOSE: CPT | Performed by: FAMILY MEDICINE

## 2021-02-24 PROCEDURE — 91301 COVID-19, MODERNA VACCINE 100MCG/0.5ML DOSE: CPT | Performed by: FAMILY MEDICINE

## 2021-03-24 ENCOUNTER — IMMUNIZATION (OUTPATIENT)
Dept: PRIMARY CARE CLINIC | Age: 66
End: 2021-03-24
Payer: COMMERCIAL

## 2021-03-24 PROCEDURE — 0012A PR IMM ADMN SARSCOV2 100 MCG/0.5 ML 2ND DOSE: CPT | Performed by: FAMILY MEDICINE

## 2021-03-24 PROCEDURE — 91301 COVID-19, MODERNA VACCINE 100MCG/0.5ML DOSE: CPT | Performed by: FAMILY MEDICINE

## 2021-05-05 ENCOUNTER — RX ONLY (OUTPATIENT)
Age: 66
Setting detail: RX ONLY
End: 2021-05-05

## 2021-05-05 RX ORDER — CLINDAMYCIN PHOSPHATE 10 MG/G
GEL TOPICAL
Qty: 1 | Refills: 1 | Status: ERX

## 2021-05-24 ENCOUNTER — CLINICAL DOCUMENTATION (OUTPATIENT)
Dept: FAMILY MEDICINE CLINIC | Age: 66
End: 2021-05-24

## 2021-09-08 ENCOUNTER — APPOINTMENT (RX ONLY)
Dept: URBAN - METROPOLITAN AREA CLINIC 170 | Facility: CLINIC | Age: 66
Setting detail: DERMATOLOGY
End: 2021-09-08

## 2021-09-08 DIAGNOSIS — L57.0 ACTINIC KERATOSIS: ICD-10-CM | Status: INADEQUATELY CONTROLLED

## 2021-09-08 DIAGNOSIS — D18.0 HEMANGIOMA: ICD-10-CM

## 2021-09-08 DIAGNOSIS — D22 MELANOCYTIC NEVI: ICD-10-CM

## 2021-09-08 DIAGNOSIS — L81.4 OTHER MELANIN HYPERPIGMENTATION: ICD-10-CM

## 2021-09-08 DIAGNOSIS — B07.8 OTHER VIRAL WARTS: ICD-10-CM | Status: INADEQUATELY CONTROLLED

## 2021-09-08 DIAGNOSIS — L82.0 INFLAMED SEBORRHEIC KERATOSIS: ICD-10-CM | Status: INADEQUATELY CONTROLLED

## 2021-09-08 DIAGNOSIS — L82.1 OTHER SEBORRHEIC KERATOSIS: ICD-10-CM

## 2021-09-08 DIAGNOSIS — L28.1 PRURIGO NODULARIS: ICD-10-CM

## 2021-09-08 PROBLEM — D18.01 HEMANGIOMA OF SKIN AND SUBCUTANEOUS TISSUE: Status: ACTIVE | Noted: 2021-09-08

## 2021-09-08 PROBLEM — D22.5 MELANOCYTIC NEVI OF TRUNK: Status: ACTIVE | Noted: 2021-09-08

## 2021-09-08 PROCEDURE — ? LIQUID NITROGEN

## 2021-09-08 PROCEDURE — ? FULL BODY SKIN EXAM

## 2021-09-08 PROCEDURE — 17110 DESTRUCTION B9 LES UP TO 14: CPT

## 2021-09-08 PROCEDURE — 17000 DESTRUCT PREMALG LESION: CPT | Mod: 59

## 2021-09-08 PROCEDURE — ? TREATMENT REGIMEN

## 2021-09-08 PROCEDURE — ? COUNSELING

## 2021-09-08 PROCEDURE — 99213 OFFICE O/P EST LOW 20 MIN: CPT | Mod: 25

## 2021-09-08 PROCEDURE — ? ADDITIONAL NOTES

## 2021-09-08 PROCEDURE — 17003 DESTRUCT PREMALG LES 2-14: CPT | Mod: 59

## 2021-09-08 ASSESSMENT — LOCATION DETAILED DESCRIPTION DERM
LOCATION DETAILED: RIGHT PLANTAR FOREFOOT OVERLYING 2ND METATARSAL
LOCATION DETAILED: LEFT ANTERIOR PROXIMAL THIGH
LOCATION DETAILED: RIGHT LATERAL SUPERIOR CHEST
LOCATION DETAILED: LEFT MEDIAL BREAST 10-11:00 REGION
LOCATION DETAILED: MIDDLE STERNUM
LOCATION DETAILED: HAIR
LOCATION DETAILED: STERNAL NOTCH
LOCATION DETAILED: GLABELLA
LOCATION DETAILED: RIGHT CENTRAL MALAR CHEEK

## 2021-09-08 ASSESSMENT — LOCATION ZONE DERM
LOCATION ZONE: FACE
LOCATION ZONE: LEG
LOCATION ZONE: TRUNK
LOCATION ZONE: FEET
LOCATION ZONE: SCALP

## 2021-09-08 ASSESSMENT — LOCATION SIMPLE DESCRIPTION DERM
LOCATION SIMPLE: LEFT THIGH
LOCATION SIMPLE: RIGHT PLANTAR SURFACE
LOCATION SIMPLE: GLABELLA
LOCATION SIMPLE: HAIR
LOCATION SIMPLE: CHEST
LOCATION SIMPLE: LEFT BREAST
LOCATION SIMPLE: RIGHT CHEEK

## 2021-09-08 NOTE — PROCEDURE: LIQUID NITROGEN
Duration Of Freeze Thaw-Cycle (Seconds): 0
Post-Care Instructions: I reviewed with the patient in detail post-care instructions. Patient is to wear sunprotection, and avoid picking at any of the treated lesions. Pt may apply Vaseline to crusted or scabbing areas.
Show Aperture Variable?: Yes
Consent: The patient's consent was obtained including but not limited to risks of crusting, scabbing, blistering, scarring, darker or lighter pigmentary change, recurrence, incomplete removal and infection.
Render Post-Care Instructions In Note?: no
Detail Level: Detailed
Number Of Freeze-Thaw Cycles: 1 freeze-thaw cycle
Medical Necessity Information: It is in your best interest to select a reason for this procedure from the list below. All of these items fulfill various CMS LCD requirements except the new and changing color options.
Medical Necessity Clause: This procedure was medically necessary because the lesions that were treated were:
Duration Of Freeze Thaw-Cycle (Seconds): 10-15

## 2021-09-30 ENCOUNTER — OFFICE VISIT (OUTPATIENT)
Dept: FAMILY MEDICINE CLINIC | Age: 66
End: 2021-09-30
Payer: COMMERCIAL

## 2021-09-30 VITALS
TEMPERATURE: 97.3 F | BODY MASS INDEX: 27.26 KG/M2 | DIASTOLIC BLOOD PRESSURE: 82 MMHG | HEART RATE: 65 BPM | OXYGEN SATURATION: 97 % | SYSTOLIC BLOOD PRESSURE: 139 MMHG | WEIGHT: 190 LBS

## 2021-09-30 DIAGNOSIS — Z23 FLU VACCINE NEED: ICD-10-CM

## 2021-09-30 DIAGNOSIS — R03.0 ELEVATED BLOOD PRESSURE READING: ICD-10-CM

## 2021-09-30 DIAGNOSIS — M54.2 NECK PAIN ON LEFT SIDE: Primary | ICD-10-CM

## 2021-09-30 PROCEDURE — 1036F TOBACCO NON-USER: CPT | Performed by: FAMILY MEDICINE

## 2021-09-30 PROCEDURE — 1123F ACP DISCUSS/DSCN MKR DOCD: CPT | Performed by: FAMILY MEDICINE

## 2021-09-30 PROCEDURE — 99213 OFFICE O/P EST LOW 20 MIN: CPT | Performed by: FAMILY MEDICINE

## 2021-09-30 PROCEDURE — 4040F PNEUMOC VAC/ADMIN/RCVD: CPT | Performed by: FAMILY MEDICINE

## 2021-09-30 PROCEDURE — G8427 DOCREV CUR MEDS BY ELIG CLIN: HCPCS | Performed by: FAMILY MEDICINE

## 2021-09-30 PROCEDURE — 3017F COLORECTAL CA SCREEN DOC REV: CPT | Performed by: FAMILY MEDICINE

## 2021-09-30 PROCEDURE — 90471 IMMUNIZATION ADMIN: CPT | Performed by: FAMILY MEDICINE

## 2021-09-30 PROCEDURE — G8417 CALC BMI ABV UP PARAM F/U: HCPCS | Performed by: FAMILY MEDICINE

## 2021-09-30 PROCEDURE — 90694 VACC AIIV4 NO PRSRV 0.5ML IM: CPT | Performed by: FAMILY MEDICINE

## 2021-09-30 ASSESSMENT — ENCOUNTER SYMPTOMS
VISUAL CHANGE: 0
PHOTOPHOBIA: 0
TROUBLE SWALLOWING: 0

## 2021-09-30 NOTE — PROGRESS NOTES
Subjective:     Patient ID:Patrick Carrasco is a 78 y.o. male. Neck Pain   This is a recurrent problem. The current episode started more than 1 year ago. The problem has been unchanged. The pain is associated with nothing. The quality of the pain is described as aching. The pain is moderate. Nothing aggravates the symptoms. The pain is same all the time. Pertinent negatives include no chest pain, fever, headaches, leg pain, numbness, pain with swallowing, paresis, photophobia, syncope, tingling, trouble swallowing, visual change, weakness or weight loss. He has tried nothing for the symptoms. The treatment provided moderate relief. --BP was up at [facility] exams--at home they are OK    No Known Allergies    Current Outpatient Medications   Medication Sig Dispense Refill    TURMERIC CURCUMIN PO Take by mouth      KRILL OIL PO Take by mouth      Ascorbic Acid (VITAMIN C) 500 MG tablet Take 1,000 mg by mouth daily       multivitamin (THERAGRAN) per tablet Take 1 tablet by mouth daily.  vitamin E 1000 UNITS capsule Take 1,000 Units by mouth daily.  Cholecalciferol (VITAMIN D-3) 5000 UNITS TABS Take  by mouth. No current facility-administered medications for this visit.        Past Medical History:   Diagnosis Date    COVID-19 virus infection 1/13/2021    Hip dysplasia        Past Surgical History:   Procedure Laterality Date    CATARACT REMOVAL Bilateral 2006    COLONOSCOPY  [id number]    HIP SURGERY      JOINT REPLACEMENT Right 2009    hip replacement    RETINAL DETACHMENT SURGERY Bilateral     x2       Social History     Socioeconomic History    Marital status:      Spouse name: Not on file    Number of children: 2    Years of education: Not on file    Highest education level: Not on file   Occupational History    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 [email or web address]   Tobacco Use    Smoking status: Never Smoker    Smokeless tobacco: Never Used   Vaping Use    Vaping Use: Never used   Substance and Sexual Activity    Alcohol use: Yes     Alcohol/week: 2.0 standard drinks     Types: 1 Glasses of wine, 10 Cans of beer per week    Drug use: No    Sexual activity: Yes     Partners: Female   Other Topics Concern    Not on file   Social History Narrative    Retired--does patient play     remarried and doing well--kids close(has grandchildren)    Exercise daily and golfs     Social Determinants of Health     Financial Resource Strain: Low Risk     Difficulty of Paying Living Expenses: Not hard at all   Food Insecurity: No Food Insecurity    Worried About Running Out of Food in the Last Year: Never true    Christy of Food in the Last Year: Never true   Transportation Needs: No Transportation Needs    Lack of Transportation (Medical): No    Lack of Transportation (Non-Medical):  No   Physical Activity:     Days of Exercise per Week:     Minutes of Exercise per Session:    Stress:     Feeling of Stress :    Social Connections:     Frequency of Communication with Friends and Family:     Frequency of Social Gatherings with Friends and Family:     Attends Synagogue Services:     Active Member of Clubs or Organizations:     Attends Club or Organization Meetings:     Marital Status:    Intimate Partner Violence:     Fear of Current or Ex-Partner:     Emotionally Abused:     Physically Abused:     Sexually Abused:        Family History   Problem Relation Age of Onset    Other Mother         SLE    Lupus Mother     Heart Disease Father     High Blood Pressure Brother     Stroke Brother     Other Brother         pheochromocytoma       Immunization History   Administered Date(s) Administered    COVID-19, Moderna, PF, 100mcg/0.5mL 02/24/2021, 03/24/2021    Influenza Virus Vaccine 11/12/2014    Influenza, Quadv, adjuvanted, 65 yrs +, IM, PF (Fluad) 10/07/2020    Pneumococcal Conjugate 13-valent ([id number]) 08/19/2019    Pneumococcal Polysaccharide ([id number]) 10/07/2020    content normal.         Judgment: Judgment normal.       No visits with results within 1 Month(s) from this visit.    Latest known visit with results is:   Orders Only on 01/13/2021   Component Date Value Ref Range Status    WBC 01/13/2021 4.7  4.0 - 11.0 K/uL Final    RBC 01/13/2021 4.79  4.20 - 5.90 M/uL Final    Hemoglobin 01/13/2021 14.0  13.5 - 17.5 g/dL Final    Hematocrit 01/13/2021 42.2  40.5 - 52.5 % Final    MCV 01/13/2021 88.0  80.0 - 100.0 fL Final    MCH 01/13/2021 29.3  26.0 - 34.0 pg Final    MCHC 01/13/2021 33.3  31.0 - 36.0 g/dL Final    RDW 01/13/2021 14.0  12.4 - 15.4 % Final    Platelets 79/42/2740 232  135 - 450 K/uL Final    MPV 01/13/2021 9.1  5.0 - 10.5 fL Final    Neutrophils % 01/13/2021 48.8  % Final    Lymphocytes % 01/13/2021 35.6  % Final    Monocytes % 01/13/2021 13.0  % Final    Eosinophils % 01/13/2021 2.3  % Final    Basophils % 01/13/2021 0.3  % Final    Neutrophils Absolute 01/13/2021 2.3  1.7 - 7.7 K/uL Final    Lymphocytes Absolute 01/13/2021 1.7  1.0 - 5.1 K/uL Final    Monocytes Absolute 01/13/2021 0.6  0.0 - 1.3 K/uL Final    Eosinophils Absolute 01/13/2021 0.1  0.0 - 0.6 K/uL Final    Basophils Absolute 01/13/2021 0.0  0.0 - 0.2 K/uL Final    Sodium 01/13/2021 138  136 - 145 mmol/L Final    Potassium 01/13/2021 4.4  3.5 - 5.1 mmol/L Final    Chloride 01/13/2021 101  99 - 110 mmol/L Final    CO2 01/13/2021 28  21 - 32 mmol/L Final    Anion Gap 01/13/2021 9  3 - 16 Final    Glucose 01/13/2021 99  70 - 99 mg/dL Final    BUN 01/13/2021 18  7 - 20 mg/dL Final    CREATININE 01/13/2021 1.0  0.8 - 1.3 mg/dL Final    GFR Non- 01/13/2021 >60  >60 Final    GFR  01/13/2021 >60  >60 Final    Calcium 01/13/2021 9.6  8.3 - 10.6 mg/dL Final    Total Protein 01/13/2021 7.4  6.4 - 8.2 g/dL Final    Albumin 01/13/2021 4.1  3.4 - 5.0 g/dL Final    Albumin/Globulin Ratio 01/13/2021 1.2  1.1 - 2.2 Final    Total Bilirubin 01/13/2021 0.4  0.0 - 1.0 mg/dL Final    Alkaline Phosphatase 01/13/2021 81  40 - 129 U/L Final    ALT 01/13/2021 24  10 - 40 U/L Final    AST 01/13/2021 27  15 - 37 U/L Final    Globulin 01/13/2021 3.3  g/dL Final    Cholesterol, Total 01/13/2021 196  0 - 199 mg/dL Final    Triglycerides 01/13/2021 42  0 - 150 mg/dL Final    HDL 01/13/2021 61* 40 - 60 mg/dL Final    LDL Calculated 01/13/2021 127* <100 mg/dL Final    VLDL Cholesterol Calculated 01/13/2021 8  Not Established mg/dL Final       Assessment and Plan:     Elevated blood pressure reading   Unclear control, lifestyle modifications recommended--watch salt and alcohol    Neck pain on left side   stretches

## 2021-12-06 ENCOUNTER — HOSPITAL ENCOUNTER (OUTPATIENT)
Dept: GENERAL RADIOLOGY | Age: 66
Discharge: HOME | End: 2021-12-06
Payer: COMMERCIAL

## 2021-12-06 ENCOUNTER — OFFICE VISIT (OUTPATIENT)
Dept: FAMILY MEDICINE CLINIC | Age: 66
End: 2021-12-06
Payer: COMMERCIAL

## 2021-12-06 ENCOUNTER — HOSPITAL ENCOUNTER (OUTPATIENT)
Age: 66
Discharge: HOME | End: 2021-12-06
Payer: COMMERCIAL

## 2021-12-06 VITALS
HEART RATE: 98 BPM | WEIGHT: 193.2 LBS | HEIGHT: 70 IN | OXYGEN SATURATION: 97 % | DIASTOLIC BLOOD PRESSURE: 84 MMHG | SYSTOLIC BLOOD PRESSURE: 138 MMHG | BODY MASS INDEX: 27.66 KG/M2

## 2021-12-06 DIAGNOSIS — M54.40 BACK PAIN OF LUMBAR REGION WITH SCIATICA: ICD-10-CM

## 2021-12-06 DIAGNOSIS — R03.0 ELEVATED BLOOD PRESSURE READING: ICD-10-CM

## 2021-12-06 DIAGNOSIS — M54.40 BACK PAIN OF LUMBAR REGION WITH SCIATICA: Primary | ICD-10-CM

## 2021-12-06 PROCEDURE — 4040F PNEUMOC VAC/ADMIN/RCVD: CPT | Performed by: FAMILY MEDICINE

## 2021-12-06 PROCEDURE — G8417 CALC BMI ABV UP PARAM F/U: HCPCS | Performed by: FAMILY MEDICINE

## 2021-12-06 PROCEDURE — 3017F COLORECTAL CA SCREEN DOC REV: CPT | Performed by: FAMILY MEDICINE

## 2021-12-06 PROCEDURE — 1036F TOBACCO NON-USER: CPT | Performed by: FAMILY MEDICINE

## 2021-12-06 PROCEDURE — G8427 DOCREV CUR MEDS BY ELIG CLIN: HCPCS | Performed by: FAMILY MEDICINE

## 2021-12-06 PROCEDURE — G8484 FLU IMMUNIZE NO ADMIN: HCPCS | Performed by: FAMILY MEDICINE

## 2021-12-06 PROCEDURE — 1123F ACP DISCUSS/DSCN MKR DOCD: CPT | Performed by: FAMILY MEDICINE

## 2021-12-06 PROCEDURE — 99213 OFFICE O/P EST LOW 20 MIN: CPT | Performed by: FAMILY MEDICINE

## 2021-12-06 PROCEDURE — 72100 X-RAY EXAM L-S SPINE 2/3 VWS: CPT

## 2021-12-06 ASSESSMENT — PATIENT HEALTH QUESTIONNAIRE - PHQ9
SUM OF ALL RESPONSES TO PHQ QUESTIONS 1-9: 0
1. LITTLE INTEREST OR PLEASURE IN DOING THINGS: 0
SUM OF ALL RESPONSES TO PHQ QUESTIONS 1-9: 0
2. FEELING DOWN, DEPRESSED OR HOPELESS: 0
SUM OF ALL RESPONSES TO PHQ QUESTIONS 1-9: 0
SUM OF ALL RESPONSES TO PHQ9 QUESTIONS 1 & 2: 0

## 2021-12-06 ASSESSMENT — ENCOUNTER SYMPTOMS
BLURRED VISION: 0
ORTHOPNEA: 0
SHORTNESS OF BREATH: 0

## 2021-12-06 NOTE — PROGRESS NOTES
Subjective:     Patient ID:Patrick Brown is a 78 y.o. male. Hypertension  This is a recurrent problem. The current episode started more than 1 month ago. The problem is unchanged. The problem is uncontrolled. Pertinent negatives include no anxiety, blurred vision, chest pain, headaches, malaise/fatigue, neck pain, orthopnea, palpitations, peripheral edema, PND, shortness of breath or sweats. There are no associated agents to hypertension. There are no known risk factors for coronary artery disease. Past treatments include lifestyle changes. The current treatment provides no improvement. There are no compliance problems. There is no history of angina, kidney disease, CAD/MI, CVA, heart failure, left ventricular hypertrophy, PVD or retinopathy. There is no history of chronic renal disease, coarctation of the aorta, hyperaldosteronism, hypercortisolism, hyperparathyroidism, a hypertension causing med, pheochromocytoma, renovascular disease, sleep apnea or a thyroid problem. No Known Allergies    Current Outpatient Medications   Medication Sig Dispense Refill    TURMERIC CURCUMIN PO Take by mouth      KRILL OIL PO Take by mouth      Ascorbic Acid (VITAMIN C) 500 MG tablet Take 1,000 mg by mouth daily       multivitamin (THERAGRAN) per tablet Take 1 tablet by mouth daily.  vitamin E 1000 UNITS capsule Take 1,000 Units by mouth daily.  Cholecalciferol (VITAMIN D-3) 5000 UNITS TABS Take  by mouth. No current facility-administered medications for this visit.        Past Medical History:   Diagnosis Date    COVID-19 virus infection 1/13/2021    Hip dysplasia        Past Surgical History:   Procedure Laterality Date    CATARACT REMOVAL Bilateral 2006    COLONOSCOPY  [id number]    HIP SURGERY      JOINT REPLACEMENT Right 2009    hip replacement    RETINAL DETACHMENT SURGERY Bilateral     x2       Social History     Socioeconomic History    Marital status:      Spouse name: Not on file    Number of children: 2    Years of education: Not on file    Highest education level: Not on file   Occupational History    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 [employer]   Tobacco Use    Smoking status: Never Smoker    Smokeless tobacco: Never Used   Vaping Use    Vaping Use: Never used   Substance and Sexual Activity    Alcohol use: Yes     Alcohol/week: 2.0 standard drinks     Types: 1 Glasses of wine, 10 Cans of beer per week    Drug use: No    Sexual activity: Yes     Partners: Female   Other Topics Concern    Not on file   Social History Narrative    Retired--does patient play     remarried and doing well--kids close(has grandchildren)    Exercise daily and golfs     Social Determinants of Health     Financial Resource Strain: Low Risk     Difficulty of Paying Living Expenses: Not hard at all   Food Insecurity: No Food Insecurity    Worried About Running Out of Food in the Last Year: Never true    Christy of Food in the Last Year: Never true   Transportation Needs: No Transportation Needs    Lack of Transportation (Medical): No    Lack of Transportation (Non-Medical):  No   Physical Activity:     Days of Exercise per Week: Not on file    Minutes of Exercise per Session: Not on file   Stress:     Feeling of Stress : Not on file   Social Connections:     Frequency of Communication with Friends and Family: Not on file    Frequency of Social Gatherings with Friends and Family: Not on file    Attends Sabianism Services: Not on file    Active Member of Clubs or Organizations: Not on file    Attends Club or Organization Meetings: Not on file    Marital Status: Not on file   Intimate Partner Violence:     Fear of Current or Ex-Partner: Not on file    Emotionally Abused: Not on file    Physically Abused: Not on file    Sexually Abused: Not on file   Housing Stability:     Unable to Pay for Housing in the Last Year: Not on file    Number of [place] in the Last Year: Not on file  Unstable Housing in the Last Year: Not on file       Family History   Problem Relation Age of Onset    Other Mother         SLE    Lupus Mother     Heart Disease Father     High Blood Pressure Brother     Stroke Brother     Other Brother         pheochromocytoma       Immunization History   Administered Date(s) Administered    KENJIBrynnjonkusum Arn, Primary or Immunocompromised, PF, 100mcg/0.5mL 02/24/2021, 03/24/2021    Influenza Virus Vaccine 11/12/2014    Influenza, Quadv, adjuvanted, 65 yrs +, IM, PF (Fluad) 10/07/2020, 09/30/2021    Pneumococcal Conjugate 13-valent ([id number]) 08/19/2019    Pneumococcal Polysaccharide ([id number]) 10/07/2020    Tdap (Boostrix, Adacel) 09/26/2008, 03/20/2018    Zoster Live (Zostavax) 12/03/2014    Zoster Recombinant (Shingrix) 10/07/2020, 02/02/2021       Review of Systems  Review of Systems   Constitutional: Negative for malaise/fatigue. Eyes: Negative for blurred vision. Respiratory: Negative for shortness of breath. Cardiovascular: Negative for chest pain, palpitations, orthopnea and PND. Musculoskeletal: Negative for neck pain. Neurological: Negative for headaches. reviewed all his numbers    Objective:   Physical Exam  Physical Exam  Vitals reviewed. Constitutional:       General: He is not in acute distress. Appearance: He is well-developed. Eyes:      Conjunctiva/sclera: Conjunctivae normal.      Pupils: Pupils are equal, round, and reactive to light. Neck:      Thyroid: No thyromegaly. Vascular: No JVD. Trachea: No tracheal deviation. Cardiovascular:      Rate and Rhythm: Normal rate and regular rhythm. Heart sounds: Normal heart sounds. No murmur heard. No gallop. Comments: 148/84([id number]/90(his)  Pulmonary:      Effort: Pulmonary effort is normal. No respiratory distress. Breath sounds: Normal breath sounds. No stridor. No wheezing or rales. Chest:      Chest wall: No tenderness.    Abdominal: General: Bowel sounds are normal. There is no distension. Palpations: Abdomen is soft. There is no mass. Tenderness: There is no abdominal tenderness. Musculoskeletal:         General: No tenderness. Lymphadenopathy:      Cervical: No cervical adenopathy. Skin:     General: Skin is warm and dry. Coloration: Skin is not pale. Findings: No erythema or rash. Neurological:      Mental Status: He is alert and oriented to person, place, and time. Cranial Nerves: No cranial nerve deficit. Motor: No abnormal muscle tone. Coordination: Coordination normal.      Deep Tendon Reflexes: Reflexes normal.   Psychiatric:         Behavior: Behavior normal.         Thought Content: Thought content normal.         Judgment: Judgment normal.         Assessment and Plan:     Elevated blood pressure reading   Borderline controlled, continue current medications and lifestyle modifications recommended    Back pain of lumbar region with sciatica   Uncontrolled, changes made today: get films--?  PT  --check BP 3x weekly

## 2021-12-09 ENCOUNTER — HOSPITAL ENCOUNTER (OUTPATIENT)
Dept: PHYSICAL THERAPY | Age: 66
Setting detail: THERAPIES SERIES
Discharge: HOME | End: 2021-12-09
Payer: COMMERCIAL

## 2021-12-09 PROCEDURE — 97110 THERAPEUTIC EXERCISES: CPT

## 2021-12-09 PROCEDURE — 97161 PT EVAL LOW COMPLEX 20 MIN: CPT

## 2021-12-09 NOTE — PLAN OF CARE
[facility]  [address], [place] [address], [address]  Phone: [phone]   Fax: [phone]        Physical Therapy Certification    Dear Referring Practitioner: Deann Fallon MD,    We had the pleasure of evaluating the following patient for physical therapy services at [facility]. A summary of our findings can be found in the initial assessment below. This includes our plan of care. If you have any questions or concerns regarding these findings, please do not hesitate to contact me at the office phone number checked above. Thank you for the referral.       Physician Signature:_______________________________Date:__________________  By signing above (or electronic signature), therapist's plan is approved by physician      Patient: Josesito Diaz   : 1955   MRN: [id number]  Referring Physician: Referring Practitioner: Deann Fallon MD      Evaluation Date: 2021      Medical Diagnosis Information:  Diagnosis: Back pain of lumbar region with sciatica (M54.40)   Treatment Diagnosis: LBP with L LE pain                                         Insurance information: PT Insurance Information: [employer], 20 visits hard max     Precautions/ Contra-indications: R THR ~  (had hip dysplasia), OA  Latex Allergy:  [x]NO      []YES  Preferred Language for Healthcare:   [x]English       []other:  C-SSRS Triggered by Intake questionnaire (Past 2 wk assessment):   [x] No, Questionnaire did not trigger screening.   [] Yes, Patient intake triggered further evaluation      [] C-SSRS Screening completed   [] PCP notified via Plan of Care  [] Emergency services notified    SUBJECTIVE:  History of Present Illness:      Pt presents with c/o LB stiffness his whole life but LBP and L LE pain (buttock, posterior thigh, knee, medial ankle) starting about 6-9 months ago without specific injury.       Pain       Patient reports pain is 5/10 pain at present and ~ 5/10 pain at its worst, 0/10 at best.   Pain increases with: sitting, driving, initial walking      Decreases with: after walking 2-3 miles HS loosens up    Pain description:  LB stiffness and achy pain, L LE pain - dull, achy, like a toothache  Paresthesias: maybe numbness L foot - but feels like L shoe is too tight which comes and goes  Pt. reports pain with coughing, sneezing and laughing:  []Yes   [x]No   []NA  Pt. reports bowel and bladder changes:      []Yes   [x]No   []NA   Pt. reports knee/hip/ankle buckling:      []Yes   [x]No   []NA    Imaging: x-ray: lumbar  Scoliosis with lumbar convexity to left. Osteophytes, lateral   Degenerative retrolisthesis L2 on L3, L3 on L4 and L4 and L5       Degenerative vacuum disc at L2-L3   Degenerative disc space narrowing L2-L3, L3-L4       Diffuse facet arthropathy with no significant changes at L4-5 and L5-S1           Impression   1. Scoliosis spondylosis. Current Functional Limitations:   [x]Yes   []No  Functional Complaints: sitting, driving, initial walking       PLOF:   [x]No functional limitations/pain   []Pre-exisiting limitations:  Pt's sleep is affected?    [x]Yes - may have to move positions to get comfortable, but does not wake him up   []No        Social support/Environment:    Family/caregiver support:   [x]Yes   []No    Home Environment:   []1 story   []>2 story   []JAIMEE   []No rail   []R handrail    []L handrail    Bathroom Environment:   []Walk in shower   []Tub   []Tub/shower combo     []Grab bars   []Shower seat   []Modified toilet   []Hand held shower head    Equipment:    []Rolling walker   []Standard walker   []Rollator   []Iftikhar-walker  []Wheelchair   []Quad cane   []Straight cane  []Bedside commode  []Hospital bed  Other:       Relevant Medical History:  R THR ~ 2012 (had hip dysplasia), OA    Co-morbidities/Complexities (which will affect course of rehabilitation):   []None           Arthritic conditions   []Rheumatoid arthritis [x]All myotomes NT except as marked below     Range Tested MMT/ Resisted PROM AROM Comments   *denotes pain Left Right Left Right Left Right    Hip Flexion  (L1-2) 5/5 5/5        Hip Extension 5/5 5/5        Hip Abduction  (L5) 4-/5 4-/5        Hip Adduction  (L3)          Hip IR   30 25      Hip ER   30  25      Knee Flexion  (L5,S1) 5/5 5/5        Knee Extension  (L3,4) 5/5 5/5        Ankle Dorsiflex  (L4) 5/5 5/5        Ankle Plantarflex  (S1,2)          Ankle Inversion          Ankle Eversion          Great Toe Ext  (L5)            [x] Not Tested  Trunk Strength     Trunk Extensors     Gluteals     Abdominals         Flexibility    [x] All NT except as marked below    [] Deficits indicated as follows:  Muscle Abnormal Findings   Hip flexors/Herson  []Decreased R   []Decreased L      Hamstrings  Degrees in 90/90 [x]Decreased R   [x]Decreased L     Right: lacking 45 deg             Left: lacking 45 deg     Gastrocs   []Decreased R   []Decreased L      Obers/TFL/ITB   []Decreased R   []Decreased L      Piriformis    []Decreased R   []Decreased L      Other:    []Decreased R   []Decreased L          Special Tests- Standing [x] All NT except as marked below    (C)= Appleka's Criteria: 3/4 Positive tests or (+) Fortins sign with two additional (+) tests  Special Test Abnormal Findings   Lashae's Sign                (C)                  []Neg   []Pos R   []Pos L      Standing Landmarks []Iliac Crests Equal   []R High  []L High  []PSIS Equal   []R High  []L High  []ASIS Equal   []R High  []L High     []Difficult to assess due to body habitus    Standing Flexion Test  (C) []Neg   []Pos R   []Pos L      March Test (Gillet's Test) []Neg   []Pos R   []Pos L      Sacral Sulci Test  []Neg   []Pos R   []Pos L          Special Tests- seated   [x] All NT except as marked below    Special Test Abnormal Findings   Seated pelvic landmarks (C) []Iliac Crests Equal   []R High   []L High  []PSIS Equal   []R High  []L High  []Difficult to assess due to body habitus   Sitting flexion test  []Neg   []Pos R   []Pos L      Hip IR PROM  []WNL []Pos R    []Pos L         Slump test/Dural tension  []Neg   []Pos R   []Pos L        Special Tests Lumbosacral and hip- supine/sidelying/prone  [x] All NT except as marked below    (L) = Laslett's Criteria: 2 positive tests  Special Test Abnormal Findings   Sit up test/ Supine Long sit test  (C) []Neg   []Pos R   []Pos L     Comments:    SI distraction                               (L) []Neg   []Pos   []NT   Thigh Thrust test                         (L) []Neg   []Pos R   []Pos L      90/90 test  []Neg   []Pos R   []Pos L      Gaenslen's test []Neg   []Pos R   []Pos L      Straight Leg Raise [x]Neg B   []Pos R   []Pos L      Crams []Neg   []Pos R   []Pos L      Lumbar Distraction  []Relief noted   []No relief noted  []Rebound pain   []NT   Hip scour []Neg   []Pos R   []Pos L      Feli's test []Neg   []Pos R   []Pos L      Fortino's test []Neg   []Pos R   []Pos L      Oscillation []Neg   []Pos R   []Pos L      Ant/Post Provocation  []Neg   []Pos R   []Pos L      SI compression                           (L) []Neg   []Pos      Prone knee flexion test               (C) []Neg   []Pos R   []Pos L     Comments:    Femoral nerve tension test []Neg   []Pos R   []Pos L      Pheasant test []Neg   []Pos R   []Pos L      Sacral thrusts                              (L) []Neg   []Pos     []Base   []Amity   []R Sacral Sulcus  []L Sacral Sulcus   []R LIZ   []L LIZ       Deep Tendon Reflexes  [x] All NT except as marked below     []All reflexes WNL or 2+ except as marked below  Abnormal Reflex Findings Left Right Comments   Faina's Reflex      Biceps (C5,6)      Brachioradialis (C6)      Triceps (C7)      Quadriceps (L3,4)     []Pendular x 3 R  []Pendular x 3 L   Achilles (S1,2)      Ankle clonus , # of beats      Babinski's reflex           Dermatomal Sensation [x] All NT except as marked below     []All dermatomes WFL for light touch except as marked below  Abnormal Dermatome Findings Left Right   Anterior groin, 2-3 inches below ASIS (L1-L2)     Middle third anterior thigh (L3)     Patella and med malleolus (L4)     Fibular head and dorsum of foot (L5)     Lateral side and plantar surface of foot (S1)     Medial aspect of posterior thigh (S2)                   Palpation     Patient reported tenderness with palpation:  []Yes :   [x]NT    Location:     Bandages/Dressings/Incisions: NA                       [x] Patient history, allergies, meds reviewed. Medical chart reviewed. See intake form. Review Of Systems (ROS):  [x]Performed Review of systems (Integumentary, CardioPulmonary, Neurological) by intake and observation. Intake form has been scanned into medical record. Patient has been instructed to contact their primary care physician regarding ROS issues if not already being addressed at this time. Barriers to/and or personal factors that will affect rehab potential:              []Age  []Sex    []Smoker              []Motivation/Lack of Motivation                        [x]Co-Morbidities              []Cognitive Function, education/learning barriers              []Environmental, home barriers              []profession/work barriers  []past PT/medical experience  []other:  Justification:     Falls Risk Assessment (30 days):   [x] Falls Risk assessed and no intervention required. [] Falls Risk assessed and Patient requires intervention due to being higher risk   TUG score (>12s at risk):     [] Falls education provided, including:         ASSESSMENT: Pt is a 78 Y. O male, presenting with c/o LBP and L LE pain. Assessment reveals deficits in strength, ROM, flexibility as well as increased pain limiting sitting, driving, initial walking. Pt will benefit from cont PT to address these deficits and promote return to highest level of functional independence.       Functional Impairments:     []Noted lumbar/proximal hip hypomobility   []Noted lumbosacral and/or generalized hypermobility   [x]Decreased Lumbosacral/hip/LE functional ROM   [x]Decreased core/proximal hip strength and neuromuscular control    [x]Decreased LE functional strength    []Abnormal reflexes/sensation/myotomal/dermatomal deficits  []Reduced balance/proprioceptive control    []other:      Functional Activity Limitations (from functional questionnaire and intake)   [x]Reduced ability to tolerate prolonged functional positions   []Reduced ability or difficulty with changes of positions or transfers between positions   []Reduced ability to maintain good posture and demonstrate good body mechanics with sitting, bending, and lifting   []Reduced ability to sleep   [x] Reduced ability or tolerance with driving and/or computer work   []Reduced ability to perform lifting, reaching, carrying tasks   []Reduced ability to squat   []Reduced ability to forward bend   [x]Reduced ability to ambulate prolonged functional periods/distances/surfaces   []Reduced ability to ascend/descend stairs   []other:       Participation Restrictions   []Reduced participation in self care activities   []Reduced participation in home management activities   []Reduced participation in work activities   [x]Reduced participation in social activities. [x]Reduced participation in sport/recreational activities. Classification:   []Signs/symptoms consistent with Lumbar instability/stabilization subgroup. []Signs/symptoms consistent with Lumbar mobilization/manipulation subgroup, myotomes and dermatomes intact. Meets manipulation criteria.     []Signs/symptoms consistent with Lumbar direction specific/centralization subgroup   [x]Signs/symptoms consistent with sciatica       []Signs/symptoms consistent with lumbar facet dysfunction   []Signs/symptoms consistent with lumbar stenosis type dysfunction   []Signs/symptoms consistent with nerve root involvement including myotome & dermatome dysfunction   []Signs/symptoms consistent with post-surgical status including: decreased ROM, strength and function. []signs/symptoms consistent with pathology which may benefit from Dry needling     []other:      Prognosis/Rehab Potential:      []Excellent   [x]Good    []Fair   []Poor    Tolerance of evaluation/treatment:    []Excellent   [x]Good    []Fair   []Poor     Physical Therapy Evaluation Complexity Justification  [x] A history of present problem with:  [] no personal factors and/or comorbidities that impact the plan of care;  [x]1-2 personal factors and/or comorbidities that impact the plan of care  []3 personal factors and/or comorbidities that impact the plan of care  [x] An examination of body systems using standardized tests and measures addressing any of the following: body structures and functions (impairments), activity limitations, and/or participation restrictions:  [] a total of 1-2 or more elements   [x] a total of 3 or more elements   [] a total of 4 or more elements   [x] A clinical presentation with:  [x] stable and/or uncomplicated characteristics   [] evolving clinical presentation with changing characteristics  [] unstable and unpredictable characteristics;   [x] Clinical decision making of [x] low, [] moderate, [] high complexity using standardized patient assessment instrument and/or measurable assessment of functional outcome. [x] EVAL (LOW) 11974 (typically 20 minutes face-to-face)  [] EVAL (MOD) 35457 (typically 30 minutes face-to-face)  [] EVAL (HIGH) 63844 (typically 45 minutes face-to-face)  [] RE-EVAL     PLAN: Begin PT focusing on: ROM, flexibility, strengthening, and HEP    Frequency/Duration: 2 days per week for 4 Weeks:  Interventions:  [x]  Therapeutic exercise including: strength training, ROM, for Lower extremity and core   [x]  NMR activation and proprioception for LE, Glutes and Core.    [x]  Manual therapy as indicated for LE, Hip and spine to include: Dry Needling/IASTM, STM, PROM, Gr I-IV mobilizations, manipulation. [x] Therapeutic activities for LE and core. [x] Gait Training including gait normalization and reducing fall risk. [x] Modalities as needed that may include: thermal agents, E-stim, Biofeedback, US, iontophoresis, mechanical traction as indicated  [x] Patient education on joint protection, postural re-education, activity modification, progression of HEP    HEP instruction: LTR, SKTC, piriformis stretch, TrA isometric, HS stretch with strap. Access Code [email or web address]     GOALS:  Patient stated goal: \"walking long distance\"  [] Progressing: [] Met: [] Not Met: [] Adjusted  Therapist goals for Patient:   Short Term Goals: To be achieved in: 2 weeks  1. Independent in initial HEP per patient tolerance, in order to prevent re-injury. [] Progressing: [] Met: [] Not Met: [] Adjusted  2. Patient will have a decrease in pain to 3/10 at worst to facilitate sitting/driving tolerance. [] Progressing: [] Met: [] Not Met: [] Adjusted    Long Term Goals: To be achieved in: 4 weeks  1. Disability index score of 6% or less for the modified oswestry to assist with reaching prior level of function. [] Progressing: [] Met: [] Not Met: [] Adjusted  2. Patient will demonstrate increased AROM of lumbar spine flexion 3 inches fingertips to floor, B SB 25 deg, PROM L hip ER/IR 45 deg, B HS 90-90 (-) 20 deg to allow for initial walking without increased complaints  [] Progressing: [] Met: [] Not Met: [] Adjusted  3. Patient will demonstrate an increase in Strength B hip abd 5/5 to allow for prolonged walking tolerance without symptoms  [] Progressing: [] Met: [] Not Met: [] Adjusted  4. Patient will have a decrease in pain to 1/10 at worst to allow for sitting/driving with rare complaints   [] Progressing: [] Met: [] Not Met: [] Adjusted  5. Independent in HEP progression per patient tolerance, in order to prevent re-injury.   [] Progressing: [] Met: [] Not Met: [] Adjusted       Electronically signed by:   Jose Antonio Suárez, DPT [id number]

## 2021-12-09 NOTE — PROGRESS NOTES
Modified Oswestry Low Back Pain Disability    Patient: Radha Bautista  : 1955  MRN: [id number]  Date: 2021  Electronically Signed by: Mark Foster, PT, DPT [id number]     Please Read: Could you please complete this questionnaire. It is designed to give us information as to how your back (or leg) trouble has affected your ability to manage in everyday life. Please answer every section. Fortunato one box only in each section that most closely describes you today  SECTION 1 - Pain Intensity  []A. The pain is mild and comes and goes  []B. The pain is mild and does not vary much  [x]C. The pain is moderate and comes and goes  []D. The pain is moderate and does not vary much  []E. The pain is severe and comes and goes  []F. The pain is severe and does not vary much SECTION 6 - Standing   []A. I can stand as long as I want without increased pain  [x]B. I can stand as long as I want but my pain increases with time  []C. Pain prevents me from standing more than 1 hour  []D. Pain prevents me from standing for more than 1/2 hour  []E. Pain prevents me from standing for more than 10 minutes  []F. I avoid standing because it increases my pain right away   SECTION 2 - Personal Care [place], etc.)  [x]A. I do not have to change the way I wash and dress myself to avoid pain  []B. I do not normally change the way I wash or dress myself even though it causes some pain  []C. Washing and dressing increases my pain, but I can do it without changing my way of doing it  []D. Washing and dressing increases my pain, and I find it necessary to change the way I do it  []E. Because of my pain I am partially unable to wash and dress without help  []F. Because of my pain I am completely unable to wash or dress without help SECTION 7 - Sleeping  []A. I get no pain when I am in bed  [x]B. I get pain in bed, but it does not prevent me from sleeping well  []C.  Because of my pain, my sleep is only 3/4 of my normal amount []D. Because of my pain, my sleep is only 1/2 of my normal amount   []E. Because of my pain, my sleep is only 1/4 of my normal amount   []F. Pain prevents me from sleeping at all     SECTION 3 - Lifting  [x]A. I can lift heavy weights without increased pain  []B. I can lift heavy weights, but it causes increased pain  []C. Pain prevents me from lifting heavy weights off of the floor but I can manage if they are conveniently positioned (ex. On a table, etc.)  []D. Pain prevents me from lifting heavy weights off of the floor, but I can manage light to medium weights if they are conveniently positioned  []E. I can lift only very light weights  []F. I cannot lift or carry anything at all SECTION 8 - Social Life   [x]A. My social life is normal and does not increase with pain  []B. My social life is normal, but it increases my level of pain  []C. Pain prevents me from participating in more energetic activities (ex. Sports, dancing, etc.)  []D. Pain prevents me from going out very often  []E. Pain has restricted my social life to my home  []F. I have hardly any social life because of my pain   SECTION 4 - Walking  []A. I have no pain when walking  [x]B. I have pain when walking, but can still walk my required normal distances  []C. Pain prevents me from walking long distances   []D. Pain prevents me from walking intermediate distances  []E. Pain prevents me from walking even short distances  []F. Pain prevents me from walking at all SECTION 9 - Traveling  []A. I get no increased pain when traveling   []B. I get some pain while traveling, but none of my usual forms of travel make it any worse  [x]C. I get increased pain when traveling, but it does not cause me to seek alternative forms of travel  []D. I get increased pain when traveling, which causes me to seek alternative forms of travel  []E. My pain restricts all forms of travel except that which is done while I am lying down   []F.  My pain restricts all forms of travel   SECTION 5 - Sitting   []A. Sitting does not cause me any pain  [x]B. I can only sit as long as I like providing that I have my choice of seating surfaces  []C. Pain prevents me from sitting for more than 1 hour  []D. Pain prevents me from sitting for more than 1/2 hour  []E. Pain prevents me from sitting for more than 10 minutes  []F. Pain prevents me from sitting at all SECTION 10 - Employment/Homemaking  [x]A. My normal job/homemaking activities do not cause pain  []B. My normal job/homemaking activities increase my pain, but I can still perform all that is required of me  []C. I can perform most of my job/homemaking duties, but pain prevents me from performing more physically stressful activities (ex. Lifting, vacuuming, etc.)  []D. Pain prevents me from doing anything but light duties  []E. Pain prevents me from doing even light duties  []F. Pain prevents me from performing any job or homemaking chores     COMMENTS:     Patient Score: 8/50      Scoring Method for the Modified Oswestry Low Back Pain Disability Questionnaire      1. Each of the 10 sections is scored separately (0 to 5 points each) then added up (max. Total = 50). EXAMPLE:  Section 1. Pain Intensity  Item Score Item Description Point Value   A I have no pain at the moment 0   B The pain is very mild at the moment 1   C The pain is moderate at the moment 2   D The pain is fairly severe at the moment 3   E The pain is very severe at the moment 4   F The pain is the worst imaginable 5     2. If all 10 sections are completed, simply double the patient's score. 3. If a section is omitted, divide the patient's total score by the number of sections completed times 5. FORMULA: [(Patient's score) / (# Sections Completed X 5)] X 100 = ____% Disability    EXAMPLE:  If number of sections completed = 9  If patient's score = 22  The equation = [22 / (9 X 5)] X 100 = 48.9% Disability    4.  Interpretation of disability scores:    SCORE SCORE 0-20% Minimal Disability Can cope with most ADL's. Usually no treatment needed, apart from advice on lifting, sitting, posture, physical fitness and diet. In this group, some patients have particular difficulty with sitting and this may be important if their occupation is sedentary (, , etc.)    99-75% Moderate Disability This group experiences more pain and problems with sitting, lifting, and standing. Travel and social life are more difficult and may well be off work. Personal care, sexual activity, and sleeping ar not grossly affected, and the back condition can usually be managed by conservative means. 40-60% Severe   Disability Pain remains the main problem in this group of patients by travel, personal care, social life, sexual activity, and sleep are also affected. These patients require detailed investigation. 60-80% Crippled   Back pain impinges on all aspects of these patients' lives both at home and at work. Positive intervention is required. % Bed-bound or exaggerating These patients are either bed-bound or exaggerating their symptoms. This can be evaluated by careful observation of the patient during the medical examination.

## 2021-12-09 NOTE — FLOWSHEET NOTE
[facility] Outpatient Therapy  [address] [place] [address], DHARMESH Dumont 51, [address]  Phone: [phone]   Fax: [phone]    Physical Therapy Treatment Note/ Progress Report:     Date:  2021    Patient Name:  Giorgio Bertrand    :  1955  MRN: [id number]    Medical/Treatment Diagnosis Information:  Diagnosis: Back pain of lumbar region with sciatica (M54.40)  Treatment Diagnosis: LBP with L LE pain  Insurance/Certification information:  PT Insurance Information: Medical [place], 20 visits hard max  Physician Information:  Referring Practitioner: Irene Johnson MD  Plan of care signed:    [] Yes  [x] No    Date of Patient follow up with Physician: ?     Progress Report: []  Yes  [x]  No     Date Range for reporting period:  Beginnin2021  Ending:  NA    Progress report due (10 Rx/or 30 days whichever is less):      Recertification due (POC duration/ or 90 days whichever is less):      Visit # Insurance Allowable Auth Needed    20 []Yes   [x]No     RESTRICTIONS/PRECAUTIONS: R THR ~  (had hip dysplasia), OA  Latex Allergy:  [x]NO      []YES  Preferred Language for Healthcare:   [x]English       []other:  Functional Scale: modified oswestry Date assessed:2021    Pain level:  5/10     SUBJECTIVE:  See eval    OBJECTIVE: See eval   Observation:    Test measurements:      Exercises/Interventions: Exercises in bold performed in department today. Items not bolded are carried forward from prior visits for continuity of the record.     Exercise/Equipment Resistance/Repetitions Other comments   LTR 5\" x 10 each B Cues for hold time   SKTC 30\" x 5 each B Cues for hand placement   Supine fig 4 piriformis stretch, push knee away 30\" x 5 each B Cues for reps   TrA isometric 10\" x 10 Cues to not hold breath   Supine HS stretch with strap 30\" x 5 each B Was doing standing but rounding back   SKT opp shoulder     Sciatic n glide     TrA with clamshell Home Exercise Program:Pt. demonstrated good understanding and knowledge of HEP. Written instructions provided. 12/09/2021: LTR, SKTC, piriformis stretch, TrA isometric, HS stretch with strap. Access Code [email or web address] (pt was doing exercises previously but cues for technique as stated above) (pain in L LE decreased to 1/10 after exercises today)    Therapeutic Exercise:   [x] (20412) Provided verbal/tactile cueing for activities related to strengthening, flexibility, endurance, ROM for improvements in LE, proximal hip, and core control with self-care, mobility, lifting, ambulation. NMR:  [] (23765) Provided verbal/tactile cueing for activities related to improving balance, coordination, kinesthetic sense, posture, motor skill, proprioception to assist with LE, proximal hip, and core control in self-care, mobility, lifting, ambulation and eccentric single leg control. Therapeutic Activities:    [] (05874) Provided verbal/tactile cueing for activities related to improving balance, coordination, kinesthetic sense, posture, motor skill, proprioception and motor activation to allow for proper function of core, proximal hip and LE with self-care and ADLs and functional mobility.      Gait Training:    [] (58389) Provided training and instruction to the patient for proper LE, core and proximal hip recruitment and positioning and eccentric body weight control with ambulation re-education including up and down stairs     Manual Treatments:  PROM / STM / Oscillations-Mobs:  G-I, II, III, IV (PA's, Inf., Post.)  [] (16465) Provided manual therapy to mobilize LE, proximal hip and/or LS spine soft tissue/joints for the purpose of modulating pain, promoting relaxation,  increasing ROM, reducing/eliminating soft tissue swelling/inflammation/restriction, improving soft tissue extensibility and allowing for proper ROM for normal function with self-care, mobility, lifting and ambulation.   - add L LE caudal pull prn    Home Exercise Program:    [x] (31454) Reviewed/Progressed HEP activities related to strengthening, flexibility, endurance, ROM of core, proximal hip and LE for functional self-care, mobility, lifting and ambulation/stair navigation   [] (22848) Reviewed/Progressed HEP activities related to improving balance, coordination, kinesthetic sense, posture, motor skill, proprioception of core, proximal hip and LE for self-care, mobility, lifting, and ambulation/stair navigation      Modalities:    [] Electric Stimulation:   [] Ultrasound:   [] Other:       Charges:  Timed Code Treatment Minutes: TE: 23   Total Treatment Minutes: 47      [x] EVAL (LOW) 70705 (typically 20 minutes face-to-face)  [] EVAL (MOD) 18659 (typically 30 minutes face-to-face)  [] EVAL (HIGH) 97840 (typically 45 minutes face-to-face)  [] RE-EVAL     [x] DA(71806) x  2     [] NMR (04631) x       [] Manual ([phone]) x       [] TA (40002) x       [] Gait Training [phone] x       [] ES(attended) (02489)  [] ES (un) (11 [phone])   [] DRY NEEDLE 1 OR 2 MUSCLES  [] DRY NEEDLE 3+ MUSCLES  [] Mech Traction (06331)  [] Ultrasound (88818)  [] Other:    GOALS:    Patient stated goal: \"walking long distance\"  [] Progressing: [] Met: [] Not Met: [] Adjusted  Therapist goals for Patient:   Short Term Goals: To be achieved in: 2 weeks  1. Independent in initial HEP per patient tolerance, in order to prevent re-injury. [] Progressing: [] Met: [] Not Met: [] Adjusted  2. Patient will have a decrease in pain to 3/10 at worst to facilitate sitting/driving tolerance. [] Progressing: [] Met: [] Not Met: [] Adjusted    Long Term Goals: To be achieved in: 4 weeks  1. Disability index score of 6% or less for the modified oswestry to assist with reaching prior level of function. [] Progressing: [] Met: [] Not Met: [] Adjusted  2.  Patient will demonstrate increased AROM of lumbar spine flexion 3 inches fingertips to floor, B SB 25 deg, PROM L hip ER/IR 45 deg, B HS 90-90 (-) 20 deg to allow for initial walking without increased complaints  [] Progressing: [] Met: [] Not Met: [] Adjusted  3. Patient will demonstrate an increase in Strength B hip abd 5/5 to allow for prolonged walking tolerance without symptoms  [] Progressing: [] Met: [] Not Met: [] Adjusted  4. Patient will have a decrease in pain to 1/10 at worst to allow for sitting/driving with rare complaints   [] Progressing: [] Met: [] Not Met: [] Adjusted  5. Independent in HEP progression per patient tolerance, in order to prevent re-injury. [] Progressing: [] Met: [] Not Met: [] Adjusted    ASSESSMENT:  See eval      Treatment/Activity Tolerance:  [x] Patient tolerated treatment well [] Patient limited by fatique  [] Patient limited by pain  [] Patient limited by other medical complications  [] Other:     Overall Progression Towards Functional goals/ Treatment Progress Update:  [] Patient is progressing as expected towards functional goals listed. [] Progression is slowed due to complexities/Impairments listed. [] Progression has been slowed due to co-morbidities. [x] Plan just implemented, too soon to assess goals progression <30days   [] Goals require adjustment due to lack of progress  [] Patient is not progressing as expected and requires additional follow up with physician  [] Other    Prognosis for POC: [x] Good [] Fair  [] Poor    Patient requires continued skilled intervention: [x] Yes  [] No        PLAN: See eval  [] Continue per plan of care [] Alter current plan (see comments)  [x] Plan of care initiated [] Hold pending MD visit [] Discharge    Electronically signed by: Kylah Paul, PT, DPT [id number]    Note: If patient does not return for scheduled/recommended follow up visits, this note will serve as a discharge from care along with the most recent update on progress.

## 2021-12-14 ENCOUNTER — HOSPITAL ENCOUNTER (OUTPATIENT)
Dept: PHYSICAL THERAPY | Age: 66
Setting detail: THERAPIES SERIES
Discharge: HOME | End: 2021-12-14
Payer: COMMERCIAL

## 2021-12-14 PROCEDURE — 97110 THERAPEUTIC EXERCISES: CPT

## 2021-12-14 NOTE — FLOWSHEET NOTE
modulating pain, promoting relaxation,  increasing ROM, reducing/eliminating soft tissue swelling/inflammation/restriction, improving soft tissue extensibility and allowing for proper ROM for normal function with self-care, mobility, lifting and ambulation.   - add L LE caudal pull prn    Home Exercise Program:    [x] (49998) Reviewed/Progressed HEP activities related to strengthening, flexibility, endurance, ROM of core, proximal hip and LE for functional self-care, mobility, lifting and ambulation/stair navigation   [] (91286) Reviewed/Progressed HEP activities related to improving balance, coordination, kinesthetic sense, posture, motor skill, proprioception of core, proximal hip and LE for self-care, mobility, lifting, and ambulation/stair navigation      Modalities:    [] Electric Stimulation:   [] Ultrasound:   [] Other:       Charges:  Timed Code Treatment Minutes: TE: 35   Total Treatment Minutes: 35      [] EVAL (LOW) 88616 (typically 20 minutes face-to-face)  [] EVAL (MOD) 94850 (typically 30 minutes face-to-face)  [] EVAL (HIGH) 15780 (typically 45 minutes face-to-face)  [] RE-EVAL     [x] EH(94287) x  2     [] NMR (72682) x       [] Manual (20404) x       [] TA (17213) x       [] Gait Training [phone]) x       [] ES(attended) (54220)  [] ES (un) (82 [id number])   [] DRY NEEDLE 1 OR 2 MUSCLES  [] DRY NEEDLE 3+ MUSCLES  [] Mech Traction (39299)  [] Ultrasound (67003)  [] Other:    GOALS:    Patient stated goal: \"walking long distance\"  [] Progressing: [] Met: [] Not Met: [] Adjusted  Therapist goals for Patient:   Short Term Goals: To be achieved in: 2 weeks  1. Independent in initial HEP per patient tolerance, in order to prevent re-injury. [] Progressing: [] Met: [] Not Met: [] Adjusted  2. Patient will have a decrease in pain to 3/10 at worst to facilitate sitting/driving tolerance. [] Progressing: [] Met: [] Not Met: [] Adjusted    Long Term Goals: To be achieved in: 4 weeks  1.  Disability index score of 6% or less for the modified oswestry to assist with reaching prior level of function. [] Progressing: [] Met: [] Not Met: [] Adjusted  2. Patient will demonstrate increased AROM of lumbar spine flexion 3 inches fingertips to floor, B SB 25 deg, PROM L hip ER/IR 45 deg, B HS 90-90 (-) 20 deg to allow for initial walking without increased complaints  [] Progressing: [] Met: [] Not Met: [] Adjusted  3. Patient will demonstrate an increase in Strength B hip abd 5/5 to allow for prolonged walking tolerance without symptoms  [] Progressing: [] Met: [] Not Met: [] Adjusted  4. Patient will have a decrease in pain to 1/10 at worst to allow for sitting/driving with rare complaints   [] Progressing: [] Met: [] Not Met: [] Adjusted  5. Independent in HEP progression per patient tolerance, in order to prevent re-injury. [] Progressing: [] Met: [] Not Met: [] Adjusted    ASSESSMENT:  Pt with good tolerance to new exercises, feels tightness in L HS. Pt will benefit from additional therapy to address deficits to return to PLOF. Treatment/Activity Tolerance:  [x] Patient tolerated treatment well [] Patient limited by fatique  [] Patient limited by pain  [] Patient limited by other medical complications  [] Other:     Overall Progression Towards Functional goals/ Treatment Progress Update:  [] Patient is progressing as expected towards functional goals listed. [] Progression is slowed due to complexities/Impairments listed. [] Progression has been slowed due to co-morbidities.   [x] Plan just implemented, too soon to assess goals progression <30days   [] Goals require adjustment due to lack of progress  [] Patient is not progressing as expected and requires additional follow up with physician  [] Other    Prognosis for POC: [x] Good [] Fair  [] Poor    Patient requires continued skilled intervention: [x] Yes  [] No        PLAN:   [x] Continue per plan of care [] Alter current plan (see comments)  [] Plan of care initiated [] Hold pending MD visit [] Discharge    Electronically signed by: Miguel Angel Brewer, PT, DPT [id number]    Note: If patient does not return for scheduled/recommended follow up visits, this note will serve as a discharge from care along with the most recent update on progress.

## 2021-12-16 ENCOUNTER — APPOINTMENT (OUTPATIENT)
Dept: PHYSICAL THERAPY | Age: 66
End: 2021-12-16
Payer: COMMERCIAL

## 2021-12-17 ENCOUNTER — HOSPITAL ENCOUNTER (OUTPATIENT)
Dept: PHYSICAL THERAPY | Age: 66
Setting detail: THERAPIES SERIES
Discharge: HOME | End: 2021-12-17
Payer: COMMERCIAL

## 2021-12-17 PROCEDURE — 97110 THERAPEUTIC EXERCISES: CPT

## 2021-12-17 NOTE — FLOWSHEET NOTE
[facility] Outpatient Therapy  [address] [place] [address], [address] [place] [address]  Phone: [phone]   Fax: [phone]    Physical Therapy Treatment Note/ Progress Report:     Date:  2021    Patient Name:  Lizzette Alejandra    :  1955  MRN: [id number]    Medical/Treatment Diagnosis Information:  Diagnosis: Back pain of lumbar region with sciatica (M54.40)  Treatment Diagnosis: LBP with L LE pain  Insurance/Certification information:  PT Insurance Information: Medical [place], 20 visits hard max  Physician Information:  Referring Practitioner: Sergio Ellington MD  Plan of care signed:    [x] Yes  [] No    Date of Patient follow up with Physician: ?     Progress Report: []  Yes  [x]  No     Date Range for reporting period:  Beginnin2021  Ending:  NA    Progress report due (10 Rx/or 30 days whichever is less):      Recertification due (POC duration/ or 90 days whichever is less):      Visit # Insurance Allowable Auth Needed   3/8 20 []Yes   [x]No     RESTRICTIONS/PRECAUTIONS: R THR ~  (had hip dysplasia), OA  Latex Allergy:  [x]NO      []YES  Preferred Language for Healthcare:   [x]English       []other:  Functional Scale: modified oswestry Date assessed:2021    Pain level:  3-4/10 LBP     SUBJECTIVE:  Pt notes did 6 mile walk Wednesday and it felt better in terms of less stiffness during walk. Still felt pain after walk. OBJECTIVE:      Exercises/Interventions: Exercises in bold performed in department today. Items not bolded are carried forward from prior visits for continuity of the record.     Exercise/Equipment Resistance/Repetitions Other comments   LTR 5\" x 3 each B    SKTC 30\" x 3 each B    Supine fig 4 piriformis stretch, push knee away 30\" x 3 each B    TrA isometric 10\" x 5    Supine HS stretch with strap 30\" x 3 each B Was doing standing but rounding back   SKT opp shoulder 30\" x 3 each B    Sciatic n glide, supine X 10 L TrA with clamshell Green, 3\" x 10 L more challenging   TrA with bridge with t-band around thighs Green, 3\" x 10                                                   Home Exercise Program:Pt. demonstrated good understanding and knowledge of HEP. Written instructions provided. 12/09/2021: LTR, SKTC, piriformis stretch, TrA isometric, HS stretch with strap. Access Code [email or web address] (pt was doing exercises previously but cues for technique as stated above) (pain in L LE decreased to 1/10 after exercises today)  12/14/2021: SKT opp shoulder, sciatic n glide. Encouraged avoiding back ext machine at gym to avoid extra pressure on spine. Also encouraged stretching after walking and may separate HEP so doesn't have to do all at one time. 12/17/2021: mayi winn. Access Code [email or web address]    Therapeutic Exercise:   [x] (51647) Provided verbal/tactile cueing for activities related to strengthening, flexibility, endurance, ROM for improvements in LE, proximal hip, and core control with self-care, mobility, lifting, ambulation. NMR:  [] (70011) Provided verbal/tactile cueing for activities related to improving balance, coordination, kinesthetic sense, posture, motor skill, proprioception to assist with LE, proximal hip, and core control in self-care, mobility, lifting, ambulation and eccentric single leg control. Therapeutic Activities:    [] (74961) Provided verbal/tactile cueing for activities related to improving balance, coordination, kinesthetic sense, posture, motor skill, proprioception and motor activation to allow for proper function of core, proximal hip and LE with self-care and ADLs and functional mobility.      Gait Training:    [] (22344) Provided training and instruction to the patient for proper LE, core and proximal hip recruitment and positioning and eccentric body weight control with ambulation re-education including up and down stairs     Manual Treatments:  PROM / STM / Oscillations-Mobs:  G-I, II, III, IV (Rachel, Inf., Post.)  [] (21787) Provided manual therapy to mobilize LE, proximal hip and/or LS spine soft tissue/joints for the purpose of modulating pain, promoting relaxation,  increasing ROM, reducing/eliminating soft tissue swelling/inflammation/restriction, improving soft tissue extensibility and allowing for proper ROM for normal function with self-care, mobility, lifting and ambulation.   - add L LE caudal pull prn    Home Exercise Program:    [x] (60087) Reviewed/Progressed HEP activities related to strengthening, flexibility, endurance, ROM of core, proximal hip and LE for functional self-care, mobility, lifting and ambulation/stair navigation   [] (52571) Reviewed/Progressed HEP activities related to improving balance, coordination, kinesthetic sense, posture, motor skill, proprioception of core, proximal hip and LE for self-care, mobility, lifting, and ambulation/stair navigation      Modalities:    [] Electric Stimulation:   [] Ultrasound:   [] Other:       Charges:  Timed Code Treatment Minutes: TE: 40   Total Treatment Minutes: 40      [] EVAL (LOW) 13225 (typically 20 minutes face-to-face)  [] EVAL (MOD) 45555 (typically 30 minutes face-to-face)  [] EVAL (HIGH) 06252 (typically 45 minutes face-to-face)  [] RE-EVAL     [x] OK(54336) x  3     [] NMR (30493) x       [] Manual (11737) x       [] TA (01645) x       [] Gait Training [phone] x       [] ES(attended) (78044)  [] ES (un) (60447)   [] DRY NEEDLE 1 OR 2 MUSCLES  [] DRY NEEDLE 3+ MUSCLES  [] Mech Traction (49543)  [] Ultrasound (58904)  [] Other:    GOALS:    Patient stated goal: \"walking long distance\"  [] Progressing: [] Met: [] Not Met: [] Adjusted  Therapist goals for Patient:   Short Term Goals: To be achieved in: 2 weeks  1. Independent in initial HEP per patient tolerance, in order to prevent re-injury. [] Progressing: [] Met: [] Not Met: [] Adjusted  2.  Patient will have a decrease in pain to 3/10 at worst to facilitate sitting/driving tolerance. [] Progressing: [] Met: [] Not Met: [] Adjusted    Long Term Goals: To be achieved in: 4 weeks  1. Disability index score of 6% or less for the modified oswestry to assist with reaching prior level of function. [] Progressing: [] Met: [] Not Met: [] Adjusted  2. Patient will demonstrate increased AROM of lumbar spine flexion 3 inches fingertips to floor, B SB 25 deg, PROM L hip ER/IR 45 deg, B HS 90-90 (-) 20 deg to allow for initial walking without increased complaints  [] Progressing: [] Met: [] Not Met: [] Adjusted  3. Patient will demonstrate an increase in Strength B hip abd 5/5 to allow for prolonged walking tolerance without symptoms  [] Progressing: [] Met: [] Not Met: [] Adjusted  4. Patient will have a decrease in pain to 1/10 at worst to allow for sitting/driving with rare complaints   [] Progressing: [] Met: [] Not Met: [] Adjusted  5. Independent in HEP progression per patient tolerance, in order to prevent re-injury. [] Progressing: [] Met: [] Not Met: [] Adjusted    ASSESSMENT:  Good tolerance with treatment today, less LBP noted at conclusion of treatment. Pt will benefit from additional therapy to address deficits to return to PLOF. Treatment/Activity Tolerance:  [x] Patient tolerated treatment well [] Patient limited by fatique  [] Patient limited by pain  [] Patient limited by other medical complications  [] Other:     Overall Progression Towards Functional goals/ Treatment Progress Update:  [] Patient is progressing as expected towards functional goals listed. [] Progression is slowed due to complexities/Impairments listed. [] Progression has been slowed due to co-morbidities.   [x] Plan just implemented, too soon to assess goals progression <30days   [] Goals require adjustment due to lack of progress  [] Patient is not progressing as expected and requires additional follow up with physician  [] Other    Prognosis for POC: [x] Good [] Fair  [] Poor    Patient requires continued skilled intervention: [x] Yes  [] No        PLAN:   [x] Continue per plan of care [] Alter current plan (see comments)  [] Plan of care initiated [] Hold pending MD visit [] Discharge    Electronically signed by: Apurva Nugent PT, DPT [id number]    Note: If patient does not return for scheduled/recommended follow up visits, this note will serve as a discharge from care along with the most recent update on progress.

## 2021-12-20 ENCOUNTER — HOSPITAL ENCOUNTER (OUTPATIENT)
Dept: PHYSICAL THERAPY | Age: 66
Setting detail: THERAPIES SERIES
Discharge: HOME | End: 2021-12-20
Payer: COMMERCIAL

## 2021-12-20 PROCEDURE — 97110 THERAPEUTIC EXERCISES: CPT

## 2021-12-20 NOTE — FLOWSHEET NOTE
[facility] Outpatient Therapy  [address] [place] [address], [address], [address]  Phone: [phone]   Fax: [phone]    Physical Therapy Treatment Note/ Progress Report:     Date:  2021    Patient Name:  Rubina Marmolejo    :  1955  MRN: [id number]    Medical/Treatment Diagnosis Information:  Diagnosis: Back pain of lumbar region with sciatica (M54.40)  Treatment Diagnosis: LBP with L LE pain  Insurance/Certification information:  PT Insurance Information: [employer] [place], 20 visits hard max  Physician Information:  Referring Practitioner: France Velazquez MD  Plan of care signed:    [x] Yes  [] No    Date of Patient follow up with Physician: ?     Progress Report: []  Yes  [x]  No     Date Range for reporting period:  Beginnin2021  Ending:  NA    Progress report due (10 Rx/or 30 days whichever is less):      Recertification due (POC duration/ or 90 days whichever is less):      Visit # Insurance Allowable Auth Needed    20 []Yes   [x]No     RESTRICTIONS/PRECAUTIONS: R THR ~  (had hip dysplasia), OA  Latex Allergy:  [x]NO      []YES  Preferred Language for Healthcare:   [x]English       []other:  Functional Scale: modified oswestry Date assessed:2021    Pain level:  0/10 LBP     SUBJECTIVE:  \"It's so refreshing to not have that hamstring pain/tightness\" when describing during a walk. Walked 5 miles today and stretched after which made pain less intense after walk (more tightness vs pain). Feels is close to normal stride now. Still with increased ache with sitting. OBJECTIVE:      Exercises/Interventions: Exercises in bold performed in department today. Items not bolded are carried forward from prior visits for continuity of the record.     Exercise/Equipment Resistance/Repetitions Other comments   LTR 5\" x 3 each B Cues for hold time   SKTC 30\" x 3 each B    Supine fig 4 piriformis stretch, push knee away 30\" x 3 each B    TrA isometric 10\" x 5    Supine HS stretch with strap 30\" x 3 each B Was doing standing but rounding back   SKT opp shoulder 30\" x 3 each B    Sciatic n glide, supine X 10 L    TrA with clamshell Green, 3\" x 10 L more challenging   TrA with bridge with t-band around thighs Green, 3\" x 10    Pelvic tilts, sitting on large red exercise ball X 10 each dir Fwd/bkwd, side to side, circles each way                                             Home Exercise Program:Pt. demonstrated good understanding and knowledge of HEP. Written instructions provided. 12/09/2021: LTR, SKTC, piriformis stretch, TrA isometric, HS stretch with strap. Access Code [email or web address] (pt was doing exercises previously but cues for technique as stated above) (pain in L LE decreased to 1/10 after exercises today)  12/14/2021: SKT opp shoulder, sciatic n glide. Encouraged avoiding back ext machine at gym to avoid extra pressure on spine. Also encouraged stretching after walking and may separate HEP so doesn't have to do all at one time. 12/17/2021: clamshell, bridges. Access Code [email or web address]  12/20/2021: seated pelvic tilts. Access Code [email or web address]    Therapeutic Exercise:   [x] (04137) Provided verbal/tactile cueing for activities related to strengthening, flexibility, endurance, ROM for improvements in LE, proximal hip, and core control with self-care, mobility, lifting, ambulation. NMR:  [] (42842) Provided verbal/tactile cueing for activities related to improving balance, coordination, kinesthetic sense, posture, motor skill, proprioception to assist with LE, proximal hip, and core control in self-care, mobility, lifting, ambulation and eccentric single leg control.      Therapeutic Activities:    [] (46304) Provided verbal/tactile cueing for activities related to improving balance, coordination, kinesthetic sense, posture, motor skill, proprioception and motor activation to allow for proper function of core, proximal hip and LE with self-care and ADLs and functional mobility.      Gait Training:    [] (52305) Provided training and instruction to the patient for proper LE, core and proximal hip recruitment and positioning and eccentric body weight control with ambulation re-education including up and down stairs     Manual Treatments:  PROM / STM / Oscillations-Mobs:  G-I, II, III, IV (PA's, Inf., Post.)  [] (75539) Provided manual therapy to mobilize LE, proximal hip and/or LS spine soft tissue/joints for the purpose of modulating pain, promoting relaxation,  increasing ROM, reducing/eliminating soft tissue swelling/inflammation/restriction, improving soft tissue extensibility and allowing for proper ROM for normal function with self-care, mobility, lifting and ambulation.   - add L LE caudal pull prn    Home Exercise Program:    [x] (46495) Reviewed/Progressed HEP activities related to strengthening, flexibility, endurance, ROM of core, proximal hip and LE for functional self-care, mobility, lifting and ambulation/stair navigation   [] (20390) Reviewed/Progressed HEP activities related to improving balance, coordination, kinesthetic sense, posture, motor skill, proprioception of core, proximal hip and LE for self-care, mobility, lifting, and ambulation/stair navigation      Modalities:    [] Electric Stimulation:   [] Ultrasound:   [] Other:       Charges:  Timed Code Treatment Minutes: TE: 38   Total Treatment Minutes: 38      [] EVAL (LOW) 82040 (typically 20 minutes face-to-face)  [] EVAL (MOD) 17772 (typically 30 minutes face-to-face)  [] EVAL (HIGH) 25045 (typically 45 minutes face-to-face)  [] RE-EVAL     [x] CK(24613) x  3     [] NMR (53855) x       [] Manual (20218) x       [] TA (99772) x       [] Gait Training [phone]) x       [] ES(attended) (61319)  [] ES (un) (92570)   [] DRY NEEDLE 1 OR 2 MUSCLES  [] DRY NEEDLE 3+ MUSCLES  [] Mech Traction (16741)  [] Ultrasound (29327)  [] Other:    GOALS:    Patient stated goal: \"walking long distance\"  [] Progressing: [] Met: [] Not Met: [] Adjusted  Therapist goals for Patient:   Short Term Goals: To be achieved in: 2 weeks  1. Independent in initial HEP per patient tolerance, in order to prevent re-injury. [] Progressing: [] Met: [] Not Met: [] Adjusted  2. Patient will have a decrease in pain to 3/10 at worst to facilitate sitting/driving tolerance. [] Progressing: [] Met: [] Not Met: [] Adjusted    Long Term Goals: To be achieved in: 4 weeks  1. Disability index score of 6% or less for the modified oswestry to assist with reaching prior level of function. [] Progressing: [] Met: [] Not Met: [] Adjusted  2. Patient will demonstrate increased AROM of lumbar spine flexion 3 inches fingertips to floor, B SB 25 deg, PROM L hip ER/IR 45 deg, B HS 90-90 (-) 20 deg to allow for initial walking without increased complaints  [] Progressing: [] Met: [] Not Met: [] Adjusted  3. Patient will demonstrate an increase in Strength B hip abd 5/5 to allow for prolonged walking tolerance without symptoms  [] Progressing: [] Met: [] Not Met: [] Adjusted  4. Patient will have a decrease in pain to 1/10 at worst to allow for sitting/driving with rare complaints   [] Progressing: [] Met: [] Not Met: [] Adjusted  5. Independent in HEP progression per patient tolerance, in order to prevent re-injury. [] Progressing: [] Met: [] Not Met: [] Adjusted    ASSESSMENT:  Overall improving symptoms noted, especially during walks. Still with difficulty with prolonged sitting. Pt will benefit from additional therapy to address deficits to return to PLOF. Treatment/Activity Tolerance:  [x] Patient tolerated treatment well [] Patient limited by fatique  [] Patient limited by pain  [] Patient limited by other medical complications  [] Other:     Overall Progression Towards Functional goals/ Treatment Progress Update:  [] Patient is progressing as expected towards functional goals listed.     [] Progression is slowed due to complexities/Impairments listed. [] Progression has been slowed due to co-morbidities. [x] Plan just implemented, too soon to assess goals progression <30days   [] Goals require adjustment due to lack of progress  [] Patient is not progressing as expected and requires additional follow up with physician  [] Other    Prognosis for POC: [x] Good [] Fair  [] Poor    Patient requires continued skilled intervention: [x] Yes  [] No        PLAN:   [x] Continue per plan of care [] Alter current plan (see comments)  [] Plan of care initiated [] Hold pending MD visit [] Discharge    Electronically signed by: Dieter Paulson, PT, DPT [id number]    Note: If patient does not return for scheduled/recommended follow up visits, this note will serve as a discharge from care along with the most recent update on progress.

## 2022-01-03 NOTE — CARE COORDINATION
[facility] Outpatient [facility]  [address] [place], [address], [address]  Phone: [phone]   Fax: [phone]    Physical Therapy Missed Visit Note     Date:  1/3/2022    Patient Name:  Shirley Pabon      :  1955    MRN: [id number]      Cancelled visits to date: 2022  No-shows to date: 0    For today's appointment patient:  [x]  Cancelled  []  Rescheduled appointment  []  No-show     Reason given by patient:  []  Patient ill  []  Conflicting appointment  []  No transportation    []  Conflict with work  []  No reason given  [x]  Other:     Comments:  Has to care for his mother    Electronically signed by:   Jose Antonio Iraheta, DPT [id number]

## 2022-01-04 ENCOUNTER — HOSPITAL ENCOUNTER (OUTPATIENT)
Dept: PHYSICAL THERAPY | Age: 67
Setting detail: THERAPIES SERIES
Discharge: HOME | End: 2022-01-04
Payer: COMMERCIAL

## 2022-01-04 PROCEDURE — 97110 THERAPEUTIC EXERCISES: CPT

## 2022-01-04 NOTE — FLOWSHEET NOTE
[facility] Outpatient [facility]  [address] [place] [address], [address], [address]  Phone: [phone]   Fax: [phone]    Physical Therapy Treatment Note/ Progress Report:     Date:  2022    Patient Name:  Willie Andrew    :  1955  MRN: [id number]    Medical/Treatment Diagnosis Information:  Diagnosis: Back pain of lumbar region with sciatica (M54.40)  Treatment Diagnosis: LBP with L LE pain  Insurance/Certification information:  PT Insurance Information: [employer] [place], 20 visits hard max  Physician Information:  Referring Practitioner: Shabana Barr MD  Plan of care signed:    [x] Yes  [] No    Date of Patient follow up with Physician: ?     Progress Report: []  Yes  [x]  No     Date Range for reporting period:  Beginnin2021  Ending:  NA    Progress report due (10 Rx/or 30 days whichever is less):      Recertification due (POC duration/ or 90 days whichever is less):       Visit # Insurance Allowable Auth Needed    20 []Yes   [x]No     RESTRICTIONS/PRECAUTIONS: R THR ~  (had hip dysplasia), OA  Latex Allergy:  [x]NO      []YES  Preferred Language for Healthcare:   [x]English       []other:  Functional Scale: modified oswestry Date assessed:2021     Pain level:  2/10 LBP stiffness    SUBJECTIVE:  Not seen recently due to pt out of town for the holidays. Pain radiating to L knee/shin. Still a little bit of tightness in HS with walking limiting L stride due to HS tightness but feels this is improving overall. OBJECTIVE:      Exercises/Interventions: Exercises in bold performed in department today. Items not bolded are carried forward from prior visits for continuity of the record.     Exercise/Equipment Resistance/Repetitions Other comments   LTR 5\" x 3 each B Cues for hold time   SKTC 30\" x 3 each B    Supine fig 4 piriformis stretch, push knee away 30\" x 3 each B    TrA isometric 10\" x 5    Supine HS stretch with verbal/tactile cueing for activities related to improving balance, coordination, kinesthetic sense, posture, motor skill, proprioception and motor activation to allow for proper function of core, proximal hip and LE with self-care and ADLs and functional mobility.      Gait Training:    [] (02676) Provided training and instruction to the patient for proper LE, core and proximal hip recruitment and positioning and eccentric body weight control with ambulation re-education including up and down stairs     Manual Treatments:  PROM / STM / Oscillations-Mobs:  G-I, II, III, IV (PA's, Inf., Post.)  [] (80160) Provided manual therapy to mobilize LE, proximal hip and/or LS spine soft tissue/joints for the purpose of modulating pain, promoting relaxation,  increasing ROM, reducing/eliminating soft tissue swelling/inflammation/restriction, improving soft tissue extensibility and allowing for proper ROM for normal function with self-care, mobility, lifting and ambulation.   - add L LE caudal pull prn    Home Exercise Program:    [x] (46187) Reviewed/Progressed HEP activities related to strengthening, flexibility, endurance, ROM of core, proximal hip and LE for functional self-care, mobility, lifting and ambulation/stair navigation   [] (51599) Reviewed/Progressed HEP activities related to improving balance, coordination, kinesthetic sense, posture, motor skill, proprioception of core, proximal hip and LE for self-care, mobility, lifting, and ambulation/stair navigation      Modalities:    [] Electric Stimulation:   [] Ultrasound:   [] Other:       Charges:  Timed Code Treatment Minutes: TE: 38   Total Treatment Minutes: 38      [] EVAL (LOW) 34629 (typically 20 minutes face-to-face)  [] EVAL (MOD) 77604 (typically 30 minutes face-to-face)  [] EVAL (HIGH) 39279 (typically 45 minutes face-to-face)  [] RE-EVAL     [x] FT(62162) x  3     [] NMR (01965) x       [] Manual (51345) x       [] TA (62830) x       [] Gait Training (60863) x       [] ES(attended) (37172)  [] ES (un) (68620)   [] DRY NEEDLE 1 OR 2 MUSCLES  [] DRY NEEDLE 3+ MUSCLES  [] [facility] Traction (97216)  [] Ultrasound (71204)  [] Other:    GOALS:    Patient stated goal: \"walking long distance\"  [] Progressing: [] Met: [] Not Met: [] Adjusted  Therapist goals for Patient:   Short Term Goals: To be achieved in: 2 weeks  1. Independent in initial HEP per patient tolerance, in order to prevent re-injury. [] Progressing: [] Met: [] Not Met: [] Adjusted  2. Patient will have a decrease in pain to 3/10 at worst to facilitate sitting/driving tolerance. [] Progressing: [] Met: [] Not Met: [] Adjusted    Long Term Goals: To be achieved in: 4 weeks  1. Disability index score of 6% or less for the modified oswestry to assist with reaching prior level of function. [] Progressing: [] Met: [] Not Met: [] Adjusted  2. Patient will demonstrate increased AROM of lumbar spine flexion 3 inches fingertips to floor, B SB 25 deg, PROM L hip ER/IR 45 deg, B HS 90-90 (-) 20 deg to allow for initial walking without increased complaints  [] Progressing: [] Met: [] Not Met: [] Adjusted  3. Patient will demonstrate an increase in Strength B hip abd 5/5 to allow for prolonged walking tolerance without symptoms  [] Progressing: [] Met: [] Not Met: [] Adjusted  4. Patient will have a decrease in pain to 1/10 at worst to allow for sitting/driving with rare complaints   [] Progressing: [] Met: [] Not Met: [] Adjusted  5. Independent in HEP progression per patient tolerance, in order to prevent re-injury. [] Progressing: [] Met: [] Not Met: [] Adjusted    ASSESSMENT:  Good tolerance with new exercises added for stretching and core strengthening without increased pain reported. Pt will benefit from additional therapy to address deficits to return to PLOF.       Treatment/Activity Tolerance:  [x] Patient tolerated treatment well [] Patient limited by fatique  [] Patient limited by pain  [] Patient limited by other medical complications  [] Other:     Overall Progression Towards Functional goals/ Treatment Progress Update:  [] Patient is progressing as expected towards functional goals listed. [] Progression is slowed due to complexities/Impairments listed. [] Progression has been slowed due to co-morbidities. [x] Plan just implemented, too soon to assess goals progression <30days   [] Goals require adjustment due to lack of progress  [] Patient is not progressing as expected and requires additional follow up with physician  [] Other    Prognosis for POC: [x] Good [] Fair  [] Poor    Patient requires continued skilled intervention: [x] Yes  [] No        PLAN:   [x] Continue per plan of care [] Alter current plan (see comments)  [] Plan of care initiated [] Hold pending MD visit [] Discharge    Electronically signed by: Coco Adame PT, DPT [id number]    Note: If patient does not return for scheduled/recommended follow up visits, this note will serve as a discharge from care along with the most recent update on progress.

## 2022-01-07 ENCOUNTER — HOSPITAL ENCOUNTER (OUTPATIENT)
Dept: PHYSICAL THERAPY | Age: 67
Setting detail: THERAPIES SERIES
Discharge: HOME | End: 2022-01-07
Payer: COMMERCIAL

## 2022-01-07 PROCEDURE — 97140 MANUAL THERAPY 1/> REGIONS: CPT

## 2022-01-07 PROCEDURE — 97110 THERAPEUTIC EXERCISES: CPT

## 2022-01-07 NOTE — PROGRESS NOTES
[facility]  [address], [address] [address], [address]  Phone: [phone]   Fax: [phone]    Physical Therapy Treatment Note/ Progress Report:     Date:  2022    Patient Name:  Royden Mortimer    :  1955  MRN: [id number]    Medical/Treatment Diagnosis Information:  Diagnosis: Back pain of lumbar region with sciatica (M54.40)  Treatment Diagnosis: LBP with L LE pain  Insurance/Certification information:  PT Insurance Information: Medical [place], 20 visits hard max  Physician Information:  Referring Practitioner: Bashir Cha MD  Plan of care signed:    [x] Yes  [] No    Date of Patient follow up with Physician: ?     Progress Report: [x]  Yes  []  No     Date Range for reporting period:  Beginnin2021  Endin2022  6 visits attended, 0 cancels, 0 no shows    Progress report due (10 Rx/or 30 days whichever is less):      Recertification due (POC duration/ or 90 days whichever is less):       Visit # Insurance Allowable Auth Needed    + 0 20 []Yes   [x]No     RESTRICTIONS/PRECAUTIONS: R THR ~  (had hip dysplasia), OA  Latex Allergy:  [x]NO      []YES  Preferred Language for Healthcare:   [x]English       []other:  Functional Scale: modified oswestry Date assessed:2021     Pain level:  2/10 LBP stiffness    SUBJECTIVE:  Walked the day after last visit about 6 miles and the first 3 miles felt no HS pain/tightness but then felt the last half and felt some after. Used foam roller to back, buttock, HS, lateral leg and took Ibuprofen and has felt all has been helpful. Pain at worst 4-5/10 to LB and L buttock/upper HS. OBJECTIVE: AROM trunk flexion 4 inches fingertips to floor, L SB 15 deg, R SB 25 deg. PROM L hip ER 32 deg, IR 34 deg, HS 90-90 (-) 21 deg. Strength R hip 4/5, L hip abd 4-/5. Exercises/Interventions: Exercises in bold performed in department today.   Items not bolded are carried forward from prior visits for continuity of the record. Exercise/Equipment Resistance/Repetitions Other comments   LTR 5\" x 3 each B Cues for hold time   SKTC 30\" x 3 each B    Supine fig 4 piriformis stretch, push knee away 30\" x 3 each B    TrA isometric 10\" x 5    Supine HS stretch with strap 30\" x 3 each B Was doing standing but rounding back   SKT opp shoulder 30\" x 3 each B    Sciatic n glide, supine X 10 L    TrA with clamshell Green, 3\" x 10 L more challenging   TrA with bridge with t-band around thighs Green, 3\" x 10    Pelvic tilts, sitting on large red exercise ball X 10 each dir Fwd/bkwd, side to side, circles each way   gastroc stretch in standing 30\" x 2 each B    Full plank 20-30\" x 2    Full side plank 20-30\" x 2 each B                               Home Exercise Program:Pt. demonstrated good understanding and knowledge of HEP. Written instructions provided. 12/09/2021: LTR, SKTC, piriformis stretch, TrA isometric, HS stretch with strap. Access Code [email or web address] (pt was doing exercises previously but cues for technique as stated above) (pain in L LE decreased to 1/10 after exercises today)  12/14/2021: SKT opp shoulder, sciatic n glide. Encouraged avoiding back ext machine at gym to avoid extra pressure on spine. Also encouraged stretching after walking and may separate HEP so doesn't have to do all at one time. 12/17/2021: tatum, bridges. Access Code [email or web address]  12/20/2021: seated pelvic tilts. Access Code [facility]  01/04/2022: gastroc stretch, modified plank, modified side plank. Access Code [id number].   Encouraged avoiding exercise machines at gym which he does crunches to avoid strain to spine  01/07/2022: full plank and full side plank to replace modified    Therapeutic Exercise:   [x] (62726) Provided verbal/tactile cueing for activities related to strengthening, flexibility, endurance, ROM for improvements in LE, proximal hip, and core control with self-care, mobility, lifting, ambulation. NMR:  [] (14693) Provided verbal/tactile cueing for activities related to improving balance, coordination, kinesthetic sense, posture, motor skill, proprioception to assist with LE, proximal hip, and core control in self-care, mobility, lifting, ambulation and eccentric single leg control. Therapeutic Activities:    [] (48303) Provided verbal/tactile cueing for activities related to improving balance, coordination, kinesthetic sense, posture, motor skill, proprioception and motor activation to allow for proper function of core, proximal hip and LE with self-care and ADLs and functional mobility.      Gait Training:    [] (62419) Provided training and instruction to the patient for proper LE, core and proximal hip recruitment and positioning and eccentric body weight control with ambulation re-education including up and down stairs     Manual Treatments:  PROM / STM / Oscillations-Mobs:  G-I, II, III, IV (PA's, Inf., Post.)  [x] (33395) Provided manual therapy to mobilize LE, proximal hip and/or LS spine soft tissue/joints for the purpose of modulating pain, promoting relaxation,  increasing ROM, reducing/eliminating soft tissue swelling/inflammation/restriction, improving soft tissue extensibility and allowing for proper ROM for normal function with self-care, mobility, lifting and ambulation.   - add L LE caudal pull prn  STM with \"the stick\" to L HS, buttock with pt in prone and good tolerance reported - felt more relaxed    Home Exercise Program:    [x] (54274) Reviewed/Progressed HEP activities related to strengthening, flexibility, endurance, ROM of core, proximal hip and LE for functional self-care, mobility, lifting and ambulation/stair navigation   [] (45170) Reviewed/Progressed HEP activities related to improving balance, coordination, kinesthetic sense, posture, motor skill, proprioception of core, proximal hip and LE for self-care, mobility, lifting, and ambulation/stair navigation      Modalities:    [] Electric Stimulation:   [] Ultrasound:   [] Other:       Charges:  Timed Code Treatment Minutes: TE: 34, MT: 8   Total Treatment Minutes: 42      [] EVAL (LOW) 20515 (typically 20 minutes face-to-face)   [] EVAL (MOD) 94958 (typically 30 minutes face-to-face)  [] EVAL (HIGH) 98797 (typically 45 minutes face-to-face)  [] RE-EVAL     [x] GE(29009) x 2     [] NMR (36425) x       [x] Manual (07359) x 1      [] TA (69222) x       [] Gait Training [phone] x       [] ES(attended) (54574)  [] ES (un) (58466)   [] DRY NEEDLE 1 OR 2 MUSCLES  [] DRY NEEDLE 3+ MUSCLES  [] Mech Traction (28541)  [] Ultrasound (46528)  [] Other:    GOALS: assessed 01/07/2022    Patient stated goal: \"walking long distance\"   [x] Progressing: [] Met: [] Not Met: [] Adjusted  Therapist goals for Patient:   Short Term Goals: To be achieved in: 2 weeks  1. Independent in initial HEP per patient tolerance, in order to prevent re-injury. [] Progressing: [x] Met: [] Not Met: [] Adjusted  2. Patient will have a decrease in pain to 3/10 at worst to facilitate sitting/driving tolerance. [x] Progressing: [] Met: [] Not Met: [] Adjusted    Long Term Goals: To be achieved in: 4 weeks  1. Disability index score of 6% or less for the modified oswestry to assist with reaching prior level of function. Not assessed  [] Progressing: [] Met: [] Not Met: [] Adjusted  2. Patient will demonstrate increased AROM of lumbar spine flexion 3 inches fingertips to floor, B SB 25 deg, PROM L hip ER/IR 45 deg, B HS 90-90 (-) 20 deg to allow for initial walking without increased complaints  [x] Progressing: [] Met: [] Not Met: [] Adjusted  3. Patient will demonstrate an increase in Strength B hip abd 5/5 to allow for prolonged walking tolerance without symptoms  [x] Progressing: [] Met: [] Not Met: [] Adjusted  4.  Patient will have a decrease in pain to 1/10 at worst to allow for sitting/driving with rare complaints   [x] Progressing: [] Met: [] Not Met: [] Adjusted  5. Independent in HEP progression per patient tolerance, in order to prevent re-injury. [x] Progressing: [] Met: [] Not Met: [] Adjusted    ASSESSMENT:  Pt making good progress towards goals but still with pain, decreased flexibility/strength limiting walking. Pt will benefit from additional therapy to address deficits to return to PLOF. Recommend pt continue 1-2x per week for 4 weeks. Treatment/Activity Tolerance:  [x] Patient tolerated treatment well [] Patient limited by fatique  [] Patient limited by pain  [] Patient limited by other medical complications  [] Other:     Overall Progression Towards Functional goals/ Treatment Progress Update:  [x] Patient is progressing as expected towards functional goals listed. [] Progression is slowed due to complexities/Impairments listed. [x] Progression has been slowed due to co-morbidities. [] Plan just implemented, too soon to assess goals progression <30days   [] Goals require adjustment due to lack of progress  [] Patient is not progressing as expected and requires additional follow up with physician  [] Other    Prognosis for POC: [x] Good [] Fair  [] Poor    Patient requires continued skilled intervention: [x] Yes  [] No        PLAN:   [x] Continue per plan of care [] Alter current plan (see comments)  [] Plan of care initiated [] Hold pending MD visit [] Discharge    Electronically signed by: Nahid Osei PT, DPT [id number]    Note: If patient does not return for scheduled/recommended follow up visits, this note will serve as a discharge from care along with the most recent update on progress.

## 2022-01-11 ENCOUNTER — HOSPITAL ENCOUNTER (OUTPATIENT)
Dept: PHYSICAL THERAPY | Age: 67
Setting detail: THERAPIES SERIES
Discharge: HOME | End: 2022-01-11
Payer: COMMERCIAL

## 2022-01-11 PROCEDURE — 97110 THERAPEUTIC EXERCISES: CPT

## 2022-01-11 NOTE — FLOWSHEET NOTE
[facility]  [address], [address] [facility], [address] [place] [address]  Phone: [phone]   Fax: [phone]    Physical Therapy Treatment Note/ Progress Report:     Date:  2022    Patient Name:  Brendalyn Brunner    :  1955  MRN: [id number]    Medical/Treatment Diagnosis Information:  Diagnosis: Back pain of lumbar region with sciatica (M54.40)  Treatment Diagnosis: LBP with L LE pain  Insurance/Certification information:  PT Insurance Information: Medical [place], 20 visits hard max  Physician Information:  Referring Practitioner: Sugar Cervantes MD  Plan of care signed:    [x] Yes  [] No    Date of Patient follow up with Physician: ?     Progress Report: []  Yes  [x]  No     Date Range for reporting period:  Beginnin2021  Ending:  NA    Progress report due (10 Rx/or 30 days whichever is less): 1014     Recertification due (POC duration/ or 90 days whichever is less):       Visit # Insurance Allowable Auth Needed    +  20 []Yes   [x]No     RESTRICTIONS/PRECAUTIONS: R THR ~  (had hip dysplasia), OA  Latex Allergy:  [x]NO      []YES  Preferred Language for Healthcare:   [x]English       []other:  Functional Scale: modified oswestry Date assessed:2021     Pain level:  2/10 \"twinge\" proximal HS    SUBJECTIVE:  Pt notes is so much better. Walked 5 miles last night with less tightness/pain overall. OBJECTIVE:       Exercises/Interventions: Exercises in bold performed in department today. Items not bolded are carried forward from prior visits for continuity of the record.     Exercise/Equipment Resistance/Repetitions Other comments   LTR 5\" x 3 each B Verbal review   SKTC 30\" x 3 each B Verbal review   Supine fig 4 piriformis stretch, push knee away 30\" x 3 each B    TrA isometric 10\" x 5    Supine HS stretch with strap 30\" x 3 each B Was doing standing but rounding back   SKT opp shoulder 30\" x 3 each B    Sciatic n glide, supine X 10 L    TrA with clamshell Green, 3\" x 10 L more challenging   TrA with bridge with t-band around thighs Green, 3\" x 10    Pelvic tilts, sitting on large red exercise ball X 10 each dir Fwd/bkwd, side to side, circles each way   gastroc stretch in standing 30\" x 2 each B    Full plank 20-30\" x 2    Full side plank 20-30\" x 2 each B    Standing or Supine hip flexor/quad stretch 30\" x 3 each B Standing more effective - cues for back straight and hold on for balance   Quadruped with opp arm/leg raises X 10                     Home Exercise Program:Pt. demonstrated good understanding and knowledge of HEP. Written instructions provided. 12/09/2021: LTR, SKTC, piriformis stretch, TrA isometric, HS stretch with strap. Access Code [email or web address] (pt was doing exercises previously but cues for technique as stated above) (pain in L LE decreased to 1/10 after exercises today)  12/14/2021: SKT opp shoulder, sciatic n glide. Encouraged avoiding back ext machine at gym to avoid extra pressure on spine. Also encouraged stretching after walking and may separate HEP so doesn't have to do all at one time. 12/17/2021: clamshell, bridges. Access Code [email or web address]  12/20/2021: seated pelvic tilts. Access Code [facility]  01/04/2022: gastroc stretch, modified plank, modified side plank. Access Code [id number]. Encouraged avoiding exercise machines at gym which he does crunches to avoid strain to spine  01/07/2022: full plank and full side plank to replace modified  01/11/2022: quadruped opp arm/leg, standing quad/hip flexor stretch. Access Code [email or web address]     Therapeutic Exercise:   [x] (67265) Provided verbal/tactile cueing for activities related to strengthening, flexibility, endurance, ROM for improvements in LE, proximal hip, and core control with self-care, mobility, lifting, ambulation.     NMR:  [] (60999) Provided verbal/tactile cueing for activities related to improving balance, coordination, kinesthetic sense, posture, motor skill, proprioception to assist with LE, proximal hip, and core control in self-care, mobility, lifting, ambulation and eccentric single leg control. Therapeutic Activities:    [] (68833) Provided verbal/tactile cueing for activities related to improving balance, coordination, kinesthetic sense, posture, motor skill, proprioception and motor activation to allow for proper function of core, proximal hip and LE with self-care and ADLs and functional mobility.      Gait Training:    [] (51786) Provided training and instruction to the patient for proper LE, core and proximal hip recruitment and positioning and eccentric body weight control with ambulation re-education including up and down stairs     Manual Treatments:  PROM / STM / Oscillations-Mobs:  G-I, II, III, IV (PA's, Inf., Post.)  [] (42039) Provided manual therapy to mobilize LE, proximal hip and/or LS spine soft tissue/joints for the purpose of modulating pain, promoting relaxation,  increasing ROM, reducing/eliminating soft tissue swelling/inflammation/restriction, improving soft tissue extensibility and allowing for proper ROM for normal function with self-care, mobility, lifting and ambulation.   - add L LE caudal pull prn  STM with \"the stick\" to L HS, buttock with pt in prone and good tolerance reported - felt more relaxed    Home Exercise Program:    [x] (16463) Reviewed/Progressed HEP activities related to strengthening, flexibility, endurance, ROM of core, proximal hip and LE for functional self-care, mobility, lifting and ambulation/stair navigation   [] (12798) Reviewed/Progressed HEP activities related to improving balance, coordination, kinesthetic sense, posture, motor skill, proprioception of core, proximal hip and LE for self-care, mobility, lifting, and ambulation/stair navigation      Modalities:    [] Electric Stimulation:   [] Ultrasound:   [] Other:       Charges:  Timed Code Treatment Minutes: TE: 42   Total Treatment Minutes: 42 [] EVAL (LOW) 78047 (typically 20 minutes face-to-face)   [] EVAL (MOD) 89536 (typically 30 minutes face-to-face)  [] EVAL (HIGH) 37563 (typically 45 minutes face-to-face)  [] RE-EVAL     [x] LX(15019) x 3     [] NMR (64024) x       [] Manual (83914) x       [] TA (58763) x       [] Gait Training [phone] x       [] ES(attended) (03801)  [] ES (un) (58902)   [] DRY NEEDLE 1 OR 2 MUSCLES  [] DRY NEEDLE 3+ MUSCLES  [] Mech Traction (94686)  [] Ultrasound (06679)  [] Other:    GOALS: assessed 01/07/2022    Patient stated goal: \"walking long distance\"   [x] Progressing: [] Met: [] Not Met: [] Adjusted  Therapist goals for Patient:   Short Term Goals: To be achieved in: 2 weeks  1. Independent in initial HEP per patient tolerance, in order to prevent re-injury. [] Progressing: [x] Met: [] Not Met: [] Adjusted  2. Patient will have a decrease in pain to 3/10 at worst to facilitate sitting/driving tolerance. [x] Progressing: [] Met: [] Not Met: [] Adjusted    Long Term Goals: To be achieved in: 4 weeks  1. Disability index score of 6% or less for the modified oswestry to assist with reaching prior level of function. Not assessed  [] Progressing: [] Met: [] Not Met: [] Adjusted  2. Patient will demonstrate increased AROM of lumbar spine flexion 3 inches fingertips to floor, B SB 25 deg, PROM L hip ER/IR 45 deg, B HS 90-90 (-) 20 deg to allow for initial walking without increased complaints  [x] Progressing: [] Met: [] Not Met: [] Adjusted  3. Patient will demonstrate an increase in Strength B hip abd 5/5 to allow for prolonged walking tolerance without symptoms  [x] Progressing: [] Met: [] Not Met: [] Adjusted  4. Patient will have a decrease in pain to 1/10 at worst to allow for sitting/driving with rare complaints   [x] Progressing: [] Met: [] Not Met: [] Adjusted  5. Independent in HEP progression per patient tolerance, in order to prevent re-injury.   [x] Progressing: [] Met: [] Not Met: [] Adjusted    ASSESSMENT:

## 2022-01-14 ENCOUNTER — APPOINTMENT (OUTPATIENT)
Dept: PHYSICAL THERAPY | Age: 67
End: 2022-01-14
Payer: COMMERCIAL

## 2022-01-17 ENCOUNTER — HOSPITAL ENCOUNTER (OUTPATIENT)
Dept: PHYSICAL THERAPY | Age: 67
Setting detail: THERAPIES SERIES
Discharge: HOME | End: 2022-01-17
Payer: COMMERCIAL

## 2022-01-17 PROCEDURE — 97110 THERAPEUTIC EXERCISES: CPT

## 2022-01-17 NOTE — FLOWSHEET NOTE
[facility] Outpatient Therapy  [address] [place] [address], [address]  Phone: [phone]   Fax: [phone]    Physical Therapy Treatment Note/ Progress Report:     Date:  2022    Patient Name:  Roshni Diaz    :  1955  MRN: [id number]    Medical/Treatment Diagnosis Information:  Diagnosis: Back pain of lumbar region with sciatica (M54.40)  Treatment Diagnosis: LBP with L LE pain  Insurance/Certification information:  PT Insurance Information: [employer] [place], 20 visits hard max. : [place]  Physician Information:  Referring Practitioner: Maria Daugherty MD  Plan of care signed:    [x] Yes  [] No    Date of Patient follow up with Physician: ?     Progress Report: []  Yes  [x]  No     Date Range for reporting period:  Beginnin2021  Ending:  NA    Progress report due (10 Rx/or 30 days whichever is less):      Recertification due (POC duration/ or 90 days whichever is less):       Visit # Insurance Allowable Auth Needed    + 2022: 20 visits per alem year []Yes   [x]No     RESTRICTIONS/PRECAUTIONS: R THR ~  (had hip dysplasia), OA  Latex Allergy:  [x]NO      []YES  Preferred Language for Healthcare:   [x]English       []other:  Functional Scale: modified oswestry Date assessed:2021     Pain level:  1-2/10 LB stiffness    SUBJECTIVE:  \"My back stiffness is much improved. \"  Still gets a little bit of pain distal buttock with walking. Some pain along L lower leg and instep, feeling of shoes are too tight - feels at rest.  Pain at worst when walking 3-4/10. Overall feels is 80% improved from IE.                         OBJECTIVE:       Exercises/Interventions: Exercises in bold performed in department today. Items not bolded are carried forward from prior visits for continuity of the record.     Exercise/Equipment Resistance/Repetitions Other comments   LTR 5\" x 3 each B Verbal review   SKTC 30\" x 3 each B Verbal review   Supine fig 4 piriformis stretch, push knee away 30\" x 3 each B    TrA isometric 10\" x 5    Supine HS stretch with strap 30\" x 5 each B Was doing standing but rounding back  Cues for rest time between reps   SKT opp shoulder 30\" x 3 each B    Sciatic n glide, supine X 10 L    TrA with clamshell Green, 3\" x 10 L more challenging   TrA with bridge with t-band around thighs Green, 3\" x 10    Pelvic tilts, sitting on large red exercise ball X 10 each dir Fwd/bkwd, side to side, circles each way   gastroc stretch in standing 30\" x 2 each B    Full plank 20-30\" x 2    Full side plank 20-30\" x 2 each B    Standing or Supine hip flexor/quad stretch 30\" x 3 each B Standing more effective - cues for back straight and hold on for balance   Quadruped with opp arm/leg raises X 10 Cues to slow down   Standing hip abd with band Green, x 10 each B Cues to slow down   Calf stretch on step 30\" x 3 each B Switch as standing is getting easier   Sitting stretch ant tib in fig 4 position 30\" x 3 each B Minimal stretch                    Home Exercise Program:Pt. demonstrated good understanding and knowledge of HEP. Written instructions provided. 12/09/2021: LTR, SKTC, piriformis stretch, TrA isometric, HS stretch with strap. Access Code [email or web address] (pt was doing exercises previously but cues for technique as stated above) (pain in L LE decreased to 1/10 after exercises today)  12/14/2021: SKT opp shoulder, sciatic n glide. Encouraged avoiding back ext machine at gym to avoid extra pressure on spine. Also encouraged stretching after walking and may separate HEP so doesn't have to do all at one time. 12/17/2021: clamshell, bridges. Access Code [email or web address]  12/20/2021: seated pelvic tilts. Access Code [facility]  01/04/2022: gastroc stretch, modified plank, modified side plank. Access Code [id number].   Encouraged avoiding exercise machines at gym which he does crunches to avoid strain to spine  01/07/2022: full plank and full side plank to replace modified  01/11/2022: quadruped opp arm/leg, standing quad/hip flexor stretch. Access Code [email or web address]   01/17/2022: standing hip abd with t-band, calf stretch on step. Access Code [email or web address]    Therapeutic Exercise:   [x] (87142) Provided verbal/tactile cueing for activities related to strengthening, flexibility, endurance, ROM for improvements in LE, proximal hip, and core control with self-care, mobility, lifting, ambulation. NMR:  [] (81776) Provided verbal/tactile cueing for activities related to improving balance, coordination, kinesthetic sense, posture, motor skill, proprioception to assist with LE, proximal hip, and core control in self-care, mobility, lifting, ambulation and eccentric single leg control. Therapeutic Activities:    [] (25868) Provided verbal/tactile cueing for activities related to improving balance, coordination, kinesthetic sense, posture, motor skill, proprioception and motor activation to allow for proper function of core, proximal hip and LE with self-care and ADLs and functional mobility.      Gait Training:    [] (31932) Provided training and instruction to the patient for proper LE, core and proximal hip recruitment and positioning and eccentric body weight control with ambulation re-education including up and down stairs     Manual Treatments:  PROM / STM / Oscillations-Mobs:  G-I, II, III, IV (PA's, Inf., Post.)  [] (27304) Provided manual therapy to mobilize LE, proximal hip and/or LS spine soft tissue/joints for the purpose of modulating pain, promoting relaxation,  increasing ROM, reducing/eliminating soft tissue swelling/inflammation/restriction, improving soft tissue extensibility and allowing for proper ROM for normal function with self-care, mobility, lifting and ambulation.   - add L LE caudal pull prn  STM with \"the stick\" to L HS, buttock with pt in prone and good tolerance reported - felt more relaxed    Home Exercise Program:    [x] (53332) Reviewed/Progressed HEP activities related to strengthening, flexibility, endurance, ROM of core, proximal hip and LE for functional self-care, mobility, lifting and ambulation/stair navigation   [] (69073) Reviewed/Progressed HEP activities related to improving balance, coordination, kinesthetic sense, posture, motor skill, proprioception of core, proximal hip and LE for self-care, mobility, lifting, and ambulation/stair navigation      Modalities:    [] Electric Stimulation:   [] Ultrasound:   [] Other:       Charges:  Timed Code Treatment Minutes: TE: 40   Total Treatment Minutes: 40      [] EVAL (LOW) 33906 (typically 20 minutes face-to-face)   [] EVAL (MOD) 21809 (typically 30 minutes face-to-face)  [] EVAL (HIGH) 11804 (typically 45 minutes face-to-face)  [] RE-EVAL     [x] NF(56474) x 3     [] NMR (25105) x       [] Manual (76946) x       [] TA (04705) x       [] Gait Training ([id number]) x       [] ES(attended) (97718)  [] ES (un) (03674)   [] DRY NEEDLE 1 OR 2 MUSCLES  [] DRY NEEDLE 3+ MUSCLES  [] Mech Traction (53194)  [] Ultrasound (43493)  [] Other:    GOALS: assessed 01/07/2022    Patient stated goal: \"walking long distance\"   [x] Progressing: [] Met: [] Not Met: [] Adjusted  Therapist goals for Patient:   Short Term Goals: To be achieved in: 2 weeks  1. Independent in initial HEP per patient tolerance, in order to prevent re-injury. [] Progressing: [x] Met: [] Not Met: [] Adjusted  2. Patient will have a decrease in pain to 3/10 at worst to facilitate sitting/driving tolerance. [x] Progressing: [] Met: [] Not Met: [] Adjusted    Long Term Goals: To be achieved in: 4 weeks  1. Disability index score of 6% or less for the modified oswestry to assist with reaching prior level of function. Not assessed  [] Progressing: [] Met: [] Not Met: [] Adjusted  2.  Patient will demonstrate increased AROM of lumbar spine flexion 3 inches fingertips to floor, B SB 25 deg, PROM L hip ER/IR 45 deg, B HS 90-90 (-) 20 deg to allow for initial walking without increased complaints  [x] Progressing: [] Met: [] Not Met: [] Adjusted  3. Patient will demonstrate an increase in Strength B hip abd 5/5 to allow for prolonged walking tolerance without symptoms  [x] Progressing: [] Met: [] Not Met: [] Adjusted  4. Patient will have a decrease in pain to 1/10 at worst to allow for sitting/driving with rare complaints   [x] Progressing: [] Met: [] Not Met: [] Adjusted  5. Independent in HEP progression per patient tolerance, in order to prevent re-injury. [x] Progressing: [] Met: [] Not Met: [] Adjusted    ASSESSMENT:  Overall decreasing symptoms and improving flexibility. Pt will benefit from additional therapy to address deficits to return to PLOF. Treatment/Activity Tolerance:  [x] Patient tolerated treatment well [] Patient limited by fatique  [] Patient limited by pain  [] Patient limited by other medical complications  [] Other:     Overall Progression Towards Functional goals/ Treatment Progress Update:  [x] Patient is progressing as expected towards functional goals listed. [] Progression is slowed due to complexities/Impairments listed. [x] Progression has been slowed due to co-morbidities. [] Plan just implemented, too soon to assess goals progression <30days   [] Goals require adjustment due to lack of progress  [] Patient is not progressing as expected and requires additional follow up with physician  [] Other    Prognosis for POC: [x] Good [] Fair  [] Poor    Patient requires continued skilled intervention: [x] Yes  [] No        PLAN:   [x] Continue per plan of care, F/U next week [] Alter current plan (see comments)  [] Plan of care initiated [] Hold pending MD visit [] Discharge    Electronically signed by: Jerardo Castrejon PT, DPT [id number]    Note: If patient does not return for scheduled/recommended follow up visits, this note will serve as a discharge from care along with the most recent update on progress.

## 2022-01-20 ENCOUNTER — APPOINTMENT (OUTPATIENT)
Dept: PHYSICAL THERAPY | Age: 67
End: 2022-01-20
Payer: COMMERCIAL

## 2022-01-25 ENCOUNTER — HOSPITAL ENCOUNTER (OUTPATIENT)
Dept: PHYSICAL THERAPY | Age: 67
Setting detail: THERAPIES SERIES
Discharge: HOME | End: 2022-01-25
Payer: COMMERCIAL

## 2022-01-25 PROCEDURE — 97110 THERAPEUTIC EXERCISES: CPT

## 2022-01-25 NOTE — FLOWSHEET NOTE
[facility] Outpatient Therapy  [address] [employer], [address], [address]  Phone: [phone]   Fax: [phone]    Physical Therapy Treatment Note/ Progress Report:     Date:  2022    Patient Name:  Chivo Moise    :  1955  MRN: [id number]    Medical/Treatment Diagnosis Information:  Diagnosis: Back pain of lumbar region with sciatica (M54.40)  Treatment Diagnosis: LBP with L LE pain  Insurance/Certification information:  PT Insurance Information: [employer], 20 visits hard max. : [place]  Physician Information:  Referring Practitioner: Kristen Miranda MD  Plan of care signed:    [x] Yes  [] No    Date of Patient follow up with Physician: 2022     Progress Report: []  Yes  [x]  No     Date Range for reporting period:  Beginnin2021  Ending:  NA    Progress report due (10 Rx/or 30 days whichever is less):      Recertification due (POC duration/ or 90 days whichever is less):       Visit # Insurance Allowable Auth Needed    + 3/8 2022: 20 visits per alem year []Yes   [x]No     RESTRICTIONS/PRECAUTIONS: R THR ~  (had hip dysplasia), OA  Latex Allergy:  [x]NO      []YES  Preferred Language for Healthcare:   [x]English       []other:  Functional Scale: modified oswestry Date assessed:2021     Pain level:  2/10 LB stiffness, HS 1/10, ache lower L leg    SUBJECTIVE:  At rest feels the pain in L lower leg laterally but doesn't bother him when he walks. The back pain/stiffness has been much better. Has been able to walk without the HS tightness, just minor that isn't affecting him at all. OBJECTIVE:       Exercises/Interventions: Exercises in bold performed in department today. Items not bolded are carried forward from prior visits for continuity of the record.     Exercise/Equipment Resistance/Repetitions Other comments   LTR 5\" x 3 each B Verbal review   SKTC 30\" x 3 each B Verbal review - may stop or do less if doesn't feel stretch   Supine fig 4 piriformis stretch, push knee away 30\" x 3 each B Verbal review   TrA isometric 10\" x 5    Supine HS stretch with strap 30\" x 5 each B Verbal review   SKT opp shoulder 30\" x 3 each B Verbal review - may stop or do less if doesn't feel stretch   Sciatic n glide, supine X 10 L Add 2nd time during day as may help L lower leg pain   TrA with clamshell Green, 3\" x 10 L more challenging  Verbal review   TrA with bridge with t-band around thighs Green, 3\" x 10 Verbal review   Pelvic tilts, sitting on large red exercise ball X 10 each dir Fwd/bkwd, side to side, circles each way   gastroc stretch in standing 30\" x 2 each B    Full plank 20-30\" x 2 review   Full side plank 20-30\" x 2 each B review   Standing or Supine hip flexor/quad stretch 30\" x 3 each B Standing more effective - cues for also pull back for added stretch   Quadruped with opp arm/leg raises X 10 review   Standing hip abd and hip ext with band Green, x 10 each B Cues for head upright   Calf stretch on step 30\" x 3 each B Switch as standing is getting easier  Verbal review   Sitting stretch ant tib in fig 4 position 30\" x 3 each B Minimal stretch                    Home Exercise Program:Pt. demonstrated good understanding and knowledge of HEP. Written instructions provided. 12/09/2021: LTR, SKTC, piriformis stretch, TrA isometric, HS stretch with strap. Access Code [email or web address] (pt was doing exercises previously but cues for technique as stated above) (pain in L LE decreased to 1/10 after exercises today)  12/14/2021: SKT opp shoulder, sciatic n glide. Encouraged avoiding back ext machine at gym to avoid extra pressure on spine. Also encouraged stretching after walking and may separate HEP so doesn't have to do all at one time. 12/17/2021: mayi winn. Access Code [email or web address]  12/20/2021: seated pelvic tilts. Access Code [facility]  01/04/2022: gastroc stretch, modified plank, modified side plank.  Access Code [id number]. Encouraged avoiding exercise machines at gym which he does crunches to avoid strain to spine  01/07/2022: full plank and full side plank to replace modified  01/11/2022: quadruped opp arm/leg, standing quad/hip flexor stretch. Access Code [email or web address]   01/17/2022: standing hip abd with t-band, calf stretch on step. Access Code [email or web address]  01/25/2022: hip ext. Access Code [id number]    Therapeutic Exercise:   [x] (97543) Provided verbal/tactile cueing for activities related to strengthening, flexibility, endurance, ROM for improvements in LE, proximal hip, and core control with self-care, mobility, lifting, ambulation. NMR:  [] (66218) Provided verbal/tactile cueing for activities related to improving balance, coordination, kinesthetic sense, posture, motor skill, proprioception to assist with LE, proximal hip, and core control in self-care, mobility, lifting, ambulation and eccentric single leg control. Therapeutic Activities:    [] (77380) Provided verbal/tactile cueing for activities related to improving balance, coordination, kinesthetic sense, posture, motor skill, proprioception and motor activation to allow for proper function of core, proximal hip and LE with self-care and ADLs and functional mobility.      Gait Training:    [] (33736) Provided training and instruction to the patient for proper LE, core and proximal hip recruitment and positioning and eccentric body weight control with ambulation re-education including up and down stairs     Manual Treatments:  PROM / STM / Oscillations-Mobs:  G-I, II, III, IV (PA's, Inf., Post.)  [x] (88752) Provided manual therapy to mobilize LE, proximal hip and/or LS spine soft tissue/joints for the purpose of modulating pain, promoting relaxation,  increasing ROM, reducing/eliminating soft tissue swelling/inflammation/restriction, improving soft tissue extensibility and allowing for proper ROM for normal function with self-care, mobility, lifting and ambulation. Attempted L LE caudal pull but no change   - add L LE caudal pull prn  STM with \"the stick\" to L HS, buttock with pt in prone and good tolerance reported - felt more relaxed    Home Exercise Program:    [x] (96412) Reviewed/Progressed HEP activities related to strengthening, flexibility, endurance, ROM of core, proximal hip and LE for functional self-care, mobility, lifting and ambulation/stair navigation   [] (21803) Reviewed/Progressed HEP activities related to improving balance, coordination, kinesthetic sense, posture, motor skill, proprioception of core, proximal hip and LE for self-care, mobility, lifting, and ambulation/stair navigation      Modalities:    [] Electric Stimulation:   [] Ultrasound:   [] Other:       Charges:  Timed Code Treatment Minutes: TE: 40   Total Treatment Minutes: 40      [] EVAL (LOW) 57540 (typically 20 minutes face-to-face)   [] EVAL (MOD) 18139 (typically 30 minutes face-to-face)  [] EVAL (HIGH) 26684 (typically 45 minutes face-to-face)  [] RE-EVAL     [x] DU(64800) x 3     [] NMR (25373) x       [] Manual (58009) x       [] TA (70180) x       [] Gait Training [phone]) x       [] ES(attended) (42402)  [] ES (un) (99690)   [] DRY NEEDLE 1 OR 2 MUSCLES  [] DRY NEEDLE 3+ MUSCLES  [] Mech Traction (16383)  [] Ultrasound (33023)  [] Other:    GOALS: assessed 01/07/2022    Patient stated goal: \"walking long distance\"   [x] Progressing: [] Met: [] Not Met: [] Adjusted  Therapist goals for Patient:   Short Term Goals: To be achieved in: 2 weeks  1. Independent in initial HEP per patient tolerance, in order to prevent re-injury. [] Progressing: [x] Met: [] Not Met: [] Adjusted  2. Patient will have a decrease in pain to 3/10 at worst to facilitate sitting/driving tolerance. [x] Progressing: [] Met: [] Not Met: [] Adjusted    Long Term Goals: To be achieved in: 4 weeks  1.  Disability index score of 6% or less for the modified oswestry to assist with reaching prior level of function. Not assessed  [] Progressing: [] Met: [] Not Met: [] Adjusted  2. Patient will demonstrate increased AROM of lumbar spine flexion 3 inches fingertips to floor, B SB 25 deg, PROM L hip ER/IR 45 deg, B HS 90-90 (-) 20 deg to allow for initial walking without increased complaints  [x] Progressing: [] Met: [] Not Met: [] Adjusted  3. Patient will demonstrate an increase in Strength B hip abd 5/5 to allow for prolonged walking tolerance without symptoms  [x] Progressing: [] Met: [] Not Met: [] Adjusted  4. Patient will have a decrease in pain to 1/10 at worst to allow for sitting/driving with rare complaints   [x] Progressing: [] Met: [] Not Met: [] Adjusted  5. Independent in HEP progression per patient tolerance, in order to prevent re-injury. [x] Progressing: [] Met: [] Not Met: [] Adjusted    ASSESSMENT:  Good tolerance with exercises today without increased pain reported, overall improving symptoms allowing improving walking tolerance but still with L lower leg ache at rest.  Pt will benefit from additional therapy to address deficits to return to PLOF. Treatment/Activity Tolerance:  [x] Patient tolerated treatment well [] Patient limited by fatique  [] Patient limited by pain  [] Patient limited by other medical complications  [] Other:     Overall Progression Towards Functional goals/ Treatment Progress Update:  [x] Patient is progressing as expected towards functional goals listed. [] Progression is slowed due to complexities/Impairments listed. [x] Progression has been slowed due to co-morbidities.   [] Plan just implemented, too soon to assess goals progression <30days   [] Goals require adjustment due to lack of progress  [] Patient is not progressing as expected and requires additional follow up with physician  [] Other    Prognosis for POC: [x] Good [] Fair  [] Poor    Patient requires continued skilled intervention: [x] Yes  [] No        PLAN:   [x] Continue per plan of care, continue 1x per week [] Alter current plan (see comments)  [] Plan of care initiated [] Hold pending MD visit [] Discharge    Electronically signed by: Marshall Aguayo PT, DPT [id number]    Note: If patient does not return for scheduled/recommended follow up visits, this note will serve as a discharge from care along with the most recent update on progress.

## 2022-01-28 ENCOUNTER — APPOINTMENT (OUTPATIENT)
Dept: PHYSICAL THERAPY | Age: 67
End: 2022-01-28
Payer: COMMERCIAL

## 2022-02-01 ENCOUNTER — HOSPITAL ENCOUNTER (OUTPATIENT)
Dept: PHYSICAL THERAPY | Age: 67
Setting detail: THERAPIES SERIES
Discharge: HOME | End: 2022-02-01
Payer: COMMERCIAL

## 2022-02-01 PROCEDURE — 97110 THERAPEUTIC EXERCISES: CPT

## 2022-02-01 NOTE — PROGRESS NOTES
[facility] Outpatient Therapy  [address] [place] [address], [address]  Phone: [phone]   Fax: [phone]    Physical Therapy Treatment Note/ Progress Report:     Date:  2022    Patient Name:  Radha March    :  1955  MRN: [id number]    Medical/Treatment Diagnosis Information:  Diagnosis: Back pain of lumbar region with sciatica (M54.40)  Treatment Diagnosis: LBP with L LE pain  Insurance/Certification information:  PT Insurance Information: [employer], 20 visits hard max. : [place]  Physician Information:  Referring Practitioner: Brittany Aburto MD  Plan of care signed:    [x] Yes  [] No    Date of Patient follow up with Physician: 2022     Progress Report: [x]  Yes  []  No     Date Range for reporting period:  Beginnin2021  Endin2022  10 visits attended, 0 cancels, 0 no shows     Progress report due (10 Rx/or 30 days whichever is less):     Recertification due (POC duration/ or 90 days whichever is less):       Visit # Insurance Allowable Auth Needed    + 2022: 20 visits per alem year []Yes   [x]No     RESTRICTIONS/PRECAUTIONS: R THR ~  (had hip dysplasia), OA  Latex Allergy:  [x]NO      []YES  Preferred Language for Healthcare:   [x]English       []other:  Functional Scale: modified oswestry Date assessed:2021     Pain level:  2-3/10 LB, upper L HS/lower buttock, L shin/lower leg    SUBJECTIVE:  Pt notes that last night he had difficulty sleeping due to pain and took acetaminophen which did help. Also felt the pain when was walking yesterday. Pain up to 6-7/10 yesterday which is the worst it's been in awhile. Relates to maybe sitting on barstool on  for a length of time. Prior to yesterday only had minimal pain. Lower leg pain hasn't changed much overall but prior to yesterday improved LB and L HS/lower buttock pain.                                  OBJECTIVE:  AROM trunk flexion 4 inches fingertips to floor, L SB 25 deg, R SB 30 deg. PROM L hip ER 33 deg, IR 38 deg, L HS 90-90 (-) 28 deg, R HS 90-90 (-) 16 deg. Strength R hip 4/5, L hip abd 4/5. Modified oswestry = 10/50 = 20% impaired        Exercises/Interventions: Exercises in bold performed in department today. Items not bolded are carried forward from prior visits for continuity of the record. Exercise/Equipment Resistance/Repetitions Other comments   LTR 5\" x 3 each B    SKTC 30\" x 3 each B Could feel today, does when feels stretch   Supine fig 4 piriformis stretch, push knee away 30\" x 3 each B Verbal review   TrA isometric 10\" x 5    Supine HS stretch with strap 30\" x 5 each B    SKT opp shoulder 30\" x 3 each B Could feel today, does when feels stretch   Sciatic n glide, supine X 10 L Today shin felt better after doing but not always the case, is doing more frequently   TrA with clamshell Green, 3\" x 10 Add 2nd set  Verbal review   TrA with bridge with t-band around thighs Green, 3\" x 10    Pelvic tilts, sitting on large red exercise ball X 10 each dir Fwd/bkwd, side to side, circles each way   gastroc stretch in standing 30\" x 2 each B    Full plank 1' x 2 Verbal review   Full side plank 1' x 2 each B Verbal review   Standing or Supine hip flexor/quad stretch 30\" x 3 each B Standing more effective - cues for also pull back for added stretch   Quadruped with opp arm/leg raises X 10    Standing hip abd and hip ext with band Green, x 10 each B Verbal review  Add 2nd set   Calf stretch on step 30\" x 3 each B Do if feels stretch  Verbal review   Sitting stretch ant tib in fig 4 position 30\" x 3 each B Minimal stretch                    Home Exercise Program:Pt. demonstrated good understanding and knowledge of HEP. Written instructions provided. 12/09/2021: LTR, SKTC, piriformis stretch, TrA isometric, HS stretch with strap.  Access Code [email or web address] (pt was doing exercises previously but cues for technique as stated above) (pain in L LE decreased to 1/10 after exercises today)  12/14/2021: SKT opp shoulder, sciatic n glide. Encouraged avoiding back ext machine at gym to avoid extra pressure on spine. Also encouraged stretching after walking and may separate HEP so doesn't have to do all at one time. 12/17/2021: mayi winn. Access Code [email or web address]  12/20/2021: seated pelvic tilts. Access Code [facility]  01/04/2022: gastroc stretch, modified plank, modified side plank. Access Code [id number]. Encouraged avoiding exercise machines at gym which he does crunches to avoid strain to spine  01/07/2022: full plank and full side plank to replace modified  01/11/2022: quadruped opp arm/leg, standing quad/hip flexor stretch. Access Code [email or web address]   01/17/2022: standing hip abd with t-band, calf stretch on step. Access Code [email or web address]  01/25/2022: hip ext. Access Code [id number]    Therapeutic Exercise:   [x] (23983) Provided verbal/tactile cueing for activities related to strengthening, flexibility, endurance, ROM for improvements in LE, proximal hip, and core control with self-care, mobility, lifting, ambulation. NMR:  [] (14405) Provided verbal/tactile cueing for activities related to improving balance, coordination, kinesthetic sense, posture, motor skill, proprioception to assist with LE, proximal hip, and core control in self-care, mobility, lifting, ambulation and eccentric single leg control. Therapeutic Activities:    [] (22321) Provided verbal/tactile cueing for activities related to improving balance, coordination, kinesthetic sense, posture, motor skill, proprioception and motor activation to allow for proper function of core, proximal hip and LE with self-care and ADLs and functional mobility.      Gait Training:    [] (36021) Provided training and instruction to the patient for proper LE, core and proximal hip recruitment and positioning and eccentric body weight control with ambulation re-education including up and down stairs Manual Treatments:  PROM / STM / Oscillations-Mobs:  G-I, II, III, IV (PA's, Inf., Post.)  [] (04205) Provided manual therapy to mobilize LE, proximal hip and/or LS spine soft tissue/joints for the purpose of modulating pain, promoting relaxation,  increasing ROM, reducing/eliminating soft tissue swelling/inflammation/restriction, improving soft tissue extensibility and allowing for proper ROM for normal function with self-care, mobility, lifting and ambulation. Attempted L LE caudal pull but no change   - add L LE caudal pull prn  STM with \"the stick\" to L HS, buttock with pt in prone and good tolerance reported - felt more relaxed    Home Exercise Program:    [x] (43851) Reviewed/Progressed HEP activities related to strengthening, flexibility, endurance, ROM of core, proximal hip and LE for functional self-care, mobility, lifting and ambulation/stair navigation   [] (90238) Reviewed/Progressed HEP activities related to improving balance, coordination, kinesthetic sense, posture, motor skill, proprioception of core, proximal hip and LE for self-care, mobility, lifting, and ambulation/stair navigation      Modalities:    [] Electric Stimulation:   [] Ultrasound:   [] Other:       Charges:  Timed Code Treatment Minutes: TE: 40   Total Treatment Minutes: 40      [] EVAL (LOW) 58937 (typically 20 minutes face-to-face)   [] EVAL (MOD) 75823 (typically 30 minutes face-to-face)  [] EVAL (HIGH) 41553 (typically 45 minutes face-to-face)  [] RE-EVAL     [x] EY(33236) x 3     [] NMR (20948) x       [] Manual (04595) x       [] TA (87335) x       [] Gait Training [phone] x       [] ES(attended) (38647)  [] ES (un) (94913)   [] DRY NEEDLE 1 OR 2 MUSCLES  [] DRY NEEDLE 3+ MUSCLES  [] Mech Traction (57284)  [] Ultrasound (60571)  [] Other:    GOALS: assessed 02/01/2022    Patient stated goal: \"walking long distance\"   [x] Progressing: [] Met: [] Not Met: [] Adjusted  Therapist goals for Patient:   Short Term Goals:  To be achieved in: 2 weeks  1. Independent in initial HEP per patient tolerance, in order to prevent re-injury. [] Progressing: [x] Met: [] Not Met: [] Adjusted  2. Patient will have a decrease in pain to 3/10 at worst to facilitate sitting/driving tolerance. [x] Progressing: [] Met: [] Not Met: [] Adjusted    Long Term Goals: To be achieved in: 4 weeks  1. Disability index score of 6% or less for the modified oswestry to assist with reaching prior level of function. [] Progressing: [] Met: [x] Not Met: [] Adjusted  2. Patient will demonstrate increased AROM of lumbar spine flexion 3 inches fingertips to floor, B SB 25 deg, PROM L hip ER/IR 45 deg, B HS 90-90 (-) 20 deg to allow for initial walking without increased complaints  [x] Progressing: [] Met: [] Not Met: [] Adjusted  3. Patient will demonstrate an increase in Strength B hip abd 5/5 to allow for prolonged walking tolerance without symptoms  [x] Progressing: [] Met: [] Not Met: [] Adjusted  4. Patient will have a decrease in pain to 1/10 at worst to allow for sitting/driving with rare complaints   [x] Progressing: [] Met: [] Not Met: [] Adjusted  5. Independent in HEP progression per patient tolerance, in order to prevent re-injury. [x] Progressing: [] Met: [] Not Met: [] Adjusted    ASSESSMENT:  Overall has improved from IE with improving pain LB and L upper HS/lower buttock but still with pain L lower leg/shin that hasn't changed much. Recent flare-up over the weekend causing more pain. Pt will benefit from additional therapy to address deficits to return to PLOF. Recommend continue 1x every other week for 4 weeks.   Pt may also benefit from additional imaging per your discretion and/or F/U with          Treatment/Activity Tolerance:  [x] Patient tolerated treatment well [] Patient limited by fatique  [] Patient limited by pain  [] Patient limited by other medical complications  [] Other:     Overall Progression Towards Functional goals/ Treatment Progress Update:  [x] Patient is progressing as expected towards functional goals listed. [] Progression is slowed due to complexities/Impairments listed. [x] Progression has been slowed due to co-morbidities. [] Plan just implemented, too soon to assess goals progression <30days   [] Goals require adjustment due to lack of progress  [] Patient is not progressing as expected and requires additional follow up with physician  [] Other    Prognosis for POC: [x] Good [] Fair  [] Poor    Patient requires continued skilled intervention: [x] Yes  [] No        PLAN:   [x] Continue per plan of care, F/U in 2 weeks  [] Alter current plan (see comments)  [] Plan of care initiated [] Hold pending MD visit [] Discharge    Electronically signed by: Anselmo De La O, PT, DPT [id number]    Note: If patient does not return for scheduled/recommended follow up visits, this note will serve as a discharge from care along with the most recent update on progress.

## 2022-02-01 NOTE — PROGRESS NOTES
Modified Oswestry Low Back Pain Disability    Patient: Willie Andrew  : 1955  MRN: [id number]  Date: 2022  Electronically Signed by: Leo Gibson, PT, DPT [id number]     Please Read: Could you please complete this questionnaire. It is designed to give us information as to how your back (or leg) trouble has affected your ability to manage in everyday life. Please answer every section. Fortunato one box only in each section that most closely describes you today  SECTION 1 - Pain Intensity  []A. The pain is mild and comes and goes  []B. The pain is mild and does not vary much  [x]C. The pain is moderate and comes and goes  []D. The pain is moderate and does not vary much  []E. The pain is severe and comes and goes  []F. The pain is severe and does not vary much SECTION 6 - Standing   [x]A. I can stand as long as I want without increased pain  []B. I can stand as long as I want but my pain increases with time  []C. Pain prevents me from standing more than 1 hour  []D. Pain prevents me from standing for more than 1/2 hour  []E. Pain prevents me from standing for more than 10 minutes  []F. I avoid standing because it increases my pain right away   SECTION 2 - Personal Care Phylicia Hurley, etc.)  []A. I do not have to change the way I wash and dress myself to avoid pain  [x]B. I do not normally change the way I wash or dress myself even though it causes some pain  []C. Washing and dressing increases my pain, but I can do it without changing my way of doing it  []D. Washing and dressing increases my pain, and I find it necessary to change the way I do it  []E. Because of my pain I am partially unable to wash and dress without help  []F. Because of my pain I am completely unable to wash or dress without help SECTION 7 - Sleeping  []A. I get no pain when I am in bed  [x]B. I get pain in bed, but it does not prevent me from sleeping well  []C.  Because of my pain, my sleep is only 3/4 of my normal amount []D. Because of my pain, my sleep is only 1/2 of my normal amount   []E. Because of my pain, my sleep is only 1/4 of my normal amount   []F. Pain prevents me from sleeping at all     SECTION 3 - Lifting  [x]A. I can lift heavy weights without increased pain  []B. I can lift heavy weights, but it causes increased pain  []C. Pain prevents me from lifting heavy weights off of the floor but I can manage if they are conveniently positioned (ex. On a table, etc.)  []D. Pain prevents me from lifting heavy weights off of the floor, but I can manage light to medium weights if they are conveniently positioned  []E. I can lift only very light weights  []F. I cannot lift or carry anything at all SECTION 8 - Social Life   [x]A. My social life is normal and does not increase with pain  []B. My social life is normal, but it increases my level of pain  []C. Pain prevents me from participating in more energetic activities (ex. Sports, dancing, etc.)  []D. Pain prevents me from going out very often  []E. Pain has restricted my social life to my home  []F. I have hardly any social life because of my pain   SECTION 4 - Walking  []A. I have no pain when walking  [x]B. I have pain when walking, but can still walk my required normal distances  []C. Pain prevents me from walking long distances   []D. Pain prevents me from walking intermediate distances  []E. Pain prevents me from walking even short distances  []F. Pain prevents me from walking at all SECTION 9 - Traveling  []A. I get no increased pain when traveling   []B. I get some pain while traveling, but none of my usual forms of travel make it any worse  [x]C. I get increased pain when traveling, but it does not cause me to seek alternative forms of travel  []D. I get increased pain when traveling, which causes me to seek alternative forms of travel  []E. My pain restricts all forms of travel except that which is done while I am lying down   []F.  My pain restricts all forms of travel   SECTION 5 - Sitting   []A. Sitting does not cause me any pain  []B. I can only sit as long as I like providing that I have my choice of seating surfaces  []C. Pain prevents me from sitting for more than 1 hour  [x]D. Pain prevents me from sitting for more than 1/2 hour  []E. Pain prevents me from sitting for more than 10 minutes  []F. Pain prevents me from sitting at all SECTION 10 - Employment/Homemaking  [x]A. My normal job/homemaking activities do not cause pain  []B. My normal job/homemaking activities increase my pain, but I can still perform all that is required of me  []C. I can perform most of my job/homemaking duties, but pain prevents me from performing more physically stressful activities (ex. Lifting, vacuuming, etc.)  []D. Pain prevents me from doing anything but light duties  []E. Pain prevents me from doing even light duties  []F. Pain prevents me from performing any job or homemaking chores     COMMENTS:     Patient Score: 10/50      Scoring Method for the Modified Oswestry Low Back Pain Disability Questionnaire      1. Each of the 10 sections is scored separately (0 to 5 points each) then added up (max. Total = 50). EXAMPLE:  Section 1. Pain Intensity  Item Score Item Description Point Value   A I have no pain at the moment 0   B The pain is very mild at the moment 1   C The pain is moderate at the moment 2   D The pain is fairly severe at the moment 3   E The pain is very severe at the moment 4   F The pain is the worst imaginable 5     2. If all 10 sections are completed, simply double the patient's score. 3. If a section is omitted, divide the patient's total score by the number of sections completed times 5. FORMULA: [(Patient's score) / (# Sections Completed X 5)] X 100 = ____% Disability    EXAMPLE:  If number of sections completed = 9  If patient's score = 22  The equation = [22 / (9 X 5)] X 100 = 48.9% Disability    4.  Interpretation of disability scores:    SCORE SCORE 0-20% Minimal Disability Can cope with most ADL's. Usually no treatment needed, apart from advice on lifting, sitting, posture, physical fitness and diet. In this group, some patients have particular difficulty with sitting and this may be important if their occupation is sedentary (, , etc.)    16-86% Moderate Disability This group experiences more pain and problems with sitting, lifting, and standing. Travel and social life are more difficult and may well be off work. Personal care, sexual activity, and sleeping ar not grossly affected, and the back condition can usually be managed by conservative means. 40-60% Severe   Disability Pain remains the main problem in this group of patients by travel, personal care, social life, sexual activity, and sleep are also affected. These patients require detailed investigation. 60-80% Crippled   Back pain impinges on all aspects of these patients' lives both at home and at work. Positive intervention is required. % Bed-bound or exaggerating These patients are either bed-bound or exaggerating their symptoms. This can be evaluated by careful observation of the patient during the medical examination.

## 2022-02-08 ENCOUNTER — APPOINTMENT (OUTPATIENT)
Dept: PHYSICAL THERAPY | Age: 67
End: 2022-02-08
Payer: COMMERCIAL

## 2022-02-15 ENCOUNTER — HOSPITAL ENCOUNTER (OUTPATIENT)
Dept: PHYSICAL THERAPY | Age: 67
Setting detail: THERAPIES SERIES
Discharge: HOME | End: 2022-02-15
Payer: COMMERCIAL

## 2022-02-15 PROCEDURE — 97012 MECHANICAL TRACTION THERAPY: CPT

## 2022-02-15 PROCEDURE — 97110 THERAPEUTIC EXERCISES: CPT

## 2022-02-15 NOTE — FLOWSHEET NOTE
Items not bolded are carried forward from prior visits for continuity of the record. Exercise/Equipment Resistance/Repetitions Other comments   LTR 5\" x 3 each B Encouraged prior to walk   SKTC 30\" x 3 each B Encouraged prior to walk if feels stretch   Supine fig 4 piriformis stretch, push knee away 30\" x 3 each B Verbal review   TrA isometric 10\" x 5    Supine HS stretch with strap 30\" x 5 each B    SKT opp shoulder 30\" x 3 each B Encouraged prior to walk if feels stretch   Sciatic n glide, supine X 10 L Today shin felt better after doing but not always the case, is doing more frequently   TrA with clamshell Green, 3\" x 10 Add 2nd set  Verbal review   TrA with bridge with t-band around thighs Green, 3\" x 10    Pelvic tilts, sitting on large red exercise ball X 10 each dir Fwd/bkwd, side to side, circles each way   gastroc stretch in standing 30\" x 2 each B    Full plank 1' x 2 Verbal review   Full side plank 1' x 2 each B Verbal review   Standing or Supine hip flexor/quad stretch 30\" x 3 each B Standing more effective - cues for also pull back for added stretch   Quadruped with opp arm/leg raises X 10    Standing hip abd and hip ext with band Green, x 10 each B Verbal review  Add 2nd set   Calf stretch on step 30\" x 3 each B Do if feels stretch  Verbal review   Sitting stretch ant tib in fig 4 position 30\" x 3 each B Minimal stretch   Prone/prone on forearms 1-2 mins as helpful Initially felt better but LB stiffness after               Home Exercise Program:Pt. demonstrated good understanding and knowledge of HEP. Written instructions provided. 12/09/2021: LTR, SKTC, piriformis stretch, TrA isometric, HS stretch with strap. Access Code [email or web address] (pt was doing exercises previously but cues for technique as stated above) (pain in L LE decreased to 1/10 after exercises today)  12/14/2021: SKT opp shoulder, sciatic n glide. Encouraged avoiding back ext machine at gym to avoid extra pressure on spine.   Also encouraged stretching after walking and may separate HEP so doesn't have to do all at one time. 12/17/2021: mayi winn. Access Code [email or web address]  12/20/2021: seated pelvic tilts. Access Code [facility]  01/04/2022: gastroc stretch, modified plank, modified side plank. Access Code [email or web address]. Encouraged avoiding exercise machines at gym which he does crunches to avoid strain to spine  01/07/2022: full plank and full side plank to replace modified  01/11/2022: quadruped opp arm/leg, standing quad/hip flexor stretch. Access Code [email or web address]   01/17/2022: standing hip abd with t-band, calf stretch on step. Access Code [email or web address]  01/25/2022: hip ext. Access Code [email or web address]  02/15/2022: encouraged LB stretching prior to walk to decrease stiffness, blue band for progressions, prone/prone on forearms as helpful    Therapeutic Exercise:   [x] (12986) Provided verbal/tactile cueing for activities related to strengthening, flexibility, endurance, ROM for improvements in LE, proximal hip, and core control with self-care, mobility, lifting, ambulation. NMR:  [] (34839) Provided verbal/tactile cueing for activities related to improving balance, coordination, kinesthetic sense, posture, motor skill, proprioception to assist with LE, proximal hip, and core control in self-care, mobility, lifting, ambulation and eccentric single leg control. Therapeutic Activities:    [] (11440) Provided verbal/tactile cueing for activities related to improving balance, coordination, kinesthetic sense, posture, motor skill, proprioception and motor activation to allow for proper function of core, proximal hip and LE with self-care and ADLs and functional mobility.      Gait Training:    [] (38331) Provided training and instruction to the patient for proper LE, core and proximal hip recruitment and positioning and eccentric body weight control with ambulation re-education including up and down stairs     Manual Treatments:  PROM / STM / Oscillations-Mobs:  G-I, II, III, IV (PA's, Inf., Post.)  [] (90384) Provided manual therapy to mobilize LE, proximal hip and/or LS spine soft tissue/joints for the purpose of modulating pain, promoting relaxation,  increasing ROM, reducing/eliminating soft tissue swelling/inflammation/restriction, improving soft tissue extensibility and allowing for proper ROM for normal function with self-care, mobility, lifting and ambulation.    Attempted L LE caudal pull but no change   - add L LE caudal pull prn  STM with \"the stick\" to L HS, buttock with pt in prone and good tolerance reported - felt more relaxed    Home Exercise Program:    [x] (89407) Reviewed/Progressed HEP activities related to strengthening, flexibility, endurance, ROM of core, proximal hip and LE for functional self-care, mobility, lifting and ambulation/stair navigation   [] (23515) Reviewed/Progressed HEP activities related to improving balance, coordination, kinesthetic sense, posture, motor skill, proprioception of core, proximal hip and LE for self-care, mobility, lifting, and ambulation/stair navigation      Modalities:    [] Electric Stimulation:   [] Ultrasound:   [x] Other: intermittent lumbar mechanical traction, 5# min - 15 (didn't feel)-25# max, x 12 mins, 60 sec hold/20 sec rest, 6 step ramp up/down each for 10 secs (pt given kill switch to use as needed if felt any adverse effects) and good pt tolerance reported, pt reported feeling different after in a good way      Charges:  Timed Code Treatment Minutes: TE: 30   Total Treatment Minutes: 42      [] EVAL (LOW) 08763 (typically 20 minutes face-to-face)   [] EVAL (MOD) 11156 (typically 30 minutes face-to-face)  [] EVAL (HIGH) 90325 (typically 45 minutes face-to-face)  [] RE-EVAL     [x] ANA(91210) x 2     [] NMR (35526) x       [] Manual (23070) x       [] TA (85168) x       [] Gait Training [phone] x       [] ES(attended) (22290)  [] ES (un) (06983)   [] DRY NEEDLE 1 OR 2 MUSCLES  [] DRY NEEDLE 3+ MUSCLES  [x] [facility] Traction (21168) x 1  [] Ultrasound (27893)  [] Other:    GOALS: assessed 02/01/2022    Patient stated goal: \"walking long distance\"   [x] Progressing: [] Met: [] Not Met: [] Adjusted  Therapist goals for Patient:   Short Term Goals: To be achieved in: 2 weeks  1. Independent in initial HEP per patient tolerance, in order to prevent re-injury. [] Progressing: [x] Met: [] Not Met: [] Adjusted  2. Patient will have a decrease in pain to 3/10 at worst to facilitate sitting/driving tolerance. [x] Progressing: [] Met: [] Not Met: [] Adjusted    Long Term Goals: To be achieved in: 4 weeks  1. Disability index score of 6% or less for the modified oswestry to assist with reaching prior level of function. [] Progressing: [] Met: [x] Not Met: [] Adjusted  2. Patient will demonstrate increased AROM of lumbar spine flexion 3 inches fingertips to floor, B SB 25 deg, PROM L hip ER/IR 45 deg, B HS 90-90 (-) 20 deg to allow for initial walking without increased complaints  [x] Progressing: [] Met: [] Not Met: [] Adjusted  3. Patient will demonstrate an increase in Strength B hip abd 5/5 to allow for prolonged walking tolerance without symptoms  [x] Progressing: [] Met: [] Not Met: [] Adjusted  4. Patient will have a decrease in pain to 1/10 at worst to allow for sitting/driving with rare complaints   [x] Progressing: [] Met: [] Not Met: [] Adjusted  5. Independent in HEP progression per patient tolerance, in order to prevent re-injury. [x] Progressing: [] Met: [] Not Met: [] Adjusted    ASSESSMENT:  Initiated intermittent lumbar mechanical traction with good pt tolerance reported. Pt to monitor effects and also monitor if positive benefit from prone or prone on forearms. Pt will benefit from additional therapy to address deficits to return to PLOF.       Treatment/Activity Tolerance:  [x] Patient tolerated treatment well [] Patient limited by fatique  [] Patient limited by pain  [] Patient limited by other medical complications  [] Other:     Overall Progression Towards Functional goals/ Treatment Progress Update:  [x] Patient is progressing as expected towards functional goals listed. [] Progression is slowed due to complexities/Impairments listed. [x] Progression has been slowed due to co-morbidities. [] Plan just implemented, too soon to assess goals progression <30days   [] Goals require adjustment due to lack of progress  [] Patient is not progressing as expected and requires additional follow up with physician  [] Other    Prognosis for POC: [x] Good [] Fair  [] Poor    Patient requires continued skilled intervention: [x] Yes  [] No        PLAN:   [x] Continue per plan of care, F/U in 2 weeks  [] Alter current plan (see comments)  [] Plan of care initiated [] Hold pending MD visit [] Discharge    Electronically signed by: Coco Adame, PT, DPT [id number]    Note: If patient does not return for scheduled/recommended follow up visits, this note will serve as a discharge from care along with the most recent update on progress.

## 2022-02-24 ENCOUNTER — APPOINTMENT (OUTPATIENT)
Dept: PHYSICAL THERAPY | Age: 67
End: 2022-02-24
Payer: COMMERCIAL

## 2022-02-28 NOTE — CARE COORDINATION
[facility]  [address], [address] [facility], [address] [place] [address] Ave  Phone: [phone]   Fax: [phone]    Physical Therapy Missed Visit Note     Date:  2022    Patient Name:  Welton Brunner      :  1955    MRN: [id number]      Cancelled visits to date: 1  2022  No-shows to date: 0    For today's appointment patient:  [x]  Cancelled  []  Rescheduled appointment  []  No-show     Reason given by patient:  []  Patient ill  []  Conflicting appointment  []  No transportation    []  Conflict with work  []  No reason given  [x]  Other:     Comments: going to  before returning to [place]     Electronically signed by:   Jose Antonio Ambriz, DPT [id number]

## 2022-03-01 ENCOUNTER — HOSPITAL ENCOUNTER (OUTPATIENT)
Dept: PHYSICAL THERAPY | Age: 67
Setting detail: THERAPIES SERIES
Discharge: HOME | End: 2022-03-01

## 2022-03-07 ENCOUNTER — OFFICE VISIT (OUTPATIENT)
Dept: FAMILY MEDICINE CLINIC | Age: 67
End: 2022-03-07
Payer: COMMERCIAL

## 2022-03-07 VITALS
SYSTOLIC BLOOD PRESSURE: 110 MMHG | DIASTOLIC BLOOD PRESSURE: 70 MMHG | HEART RATE: 80 BPM | OXYGEN SATURATION: 98 % | HEIGHT: 70 IN | WEIGHT: 190.8 LBS | BODY MASS INDEX: 27.32 KG/M2

## 2022-03-07 DIAGNOSIS — M54.40 BACK PAIN OF LUMBAR REGION WITH SCIATICA: ICD-10-CM

## 2022-03-07 DIAGNOSIS — R03.0 ELEVATED BLOOD PRESSURE READING: ICD-10-CM

## 2022-03-07 PROCEDURE — 99213 OFFICE O/P EST LOW 20 MIN: CPT | Performed by: FAMILY MEDICINE

## 2022-03-07 SDOH — ECONOMIC STABILITY: FOOD INSECURITY: WITHIN THE PAST 12 MONTHS, THE FOOD YOU BOUGHT JUST DIDN'T LAST AND YOU DIDN'T HAVE MONEY TO GET MORE.: NEVER TRUE

## 2022-03-07 SDOH — ECONOMIC STABILITY: FOOD INSECURITY: WITHIN THE PAST 12 MONTHS, YOU WORRIED THAT YOUR FOOD WOULD RUN OUT BEFORE YOU GOT MONEY TO BUY MORE.: NEVER TRUE

## 2022-03-07 ASSESSMENT — PATIENT HEALTH QUESTIONNAIRE - PHQ9
SUM OF ALL RESPONSES TO PHQ9 QUESTIONS 1 & 2: 0
SUM OF ALL RESPONSES TO PHQ QUESTIONS 1-9: 0
2. FEELING DOWN, DEPRESSED OR HOPELESS: 0
1. LITTLE INTEREST OR PLEASURE IN DOING THINGS: 0

## 2022-03-07 ASSESSMENT — ENCOUNTER SYMPTOMS
ABDOMINAL PAIN: 0
BACK PAIN: 1
BOWEL INCONTINENCE: 0

## 2022-03-07 ASSESSMENT — SOCIAL DETERMINANTS OF HEALTH (SDOH): HOW HARD IS IT FOR YOU TO PAY FOR THE VERY BASICS LIKE FOOD, HOUSING, MEDICAL CARE, AND HEATING?: NOT HARD AT ALL

## 2022-03-07 NOTE — PROGRESS NOTES
Subjective:     Patient ID:Patrick Edward is a 78 y.o. male. Back Pain  This is a chronic problem. The current episode started more than 1 year ago. The problem occurs constantly. The problem is unchanged. The pain is present in the lumbar spine. The quality of the pain is described as aching. The pain does not radiate. The pain is moderate. The symptoms are aggravated by bending. Stiffness is present all day. Pertinent negatives include no abdominal pain, bladder incontinence, bowel incontinence, chest pain, dysuria, fever, headaches, leg pain, numbness, paresis, paresthesias, pelvic pain, perianal numbness, tingling, weakness or weight loss. Treatments tried: PT. The treatment provided moderate relief. No Known Allergies    Current Outpatient Medications   Medication Sig Dispense Refill    TURMERIC CURCUMIN PO Take by mouth      KRILL OIL PO Take by mouth      Ascorbic Acid (VITAMIN C) 500 MG tablet Take 1,000 mg by mouth daily       multivitamin (THERAGRAN) per tablet Take 1 tablet by mouth daily.  vitamin E 1000 UNITS capsule Take 1,000 Units by mouth daily.  Cholecalciferol (VITAMIN D-3) 5000 UNITS TABS Take  by mouth. No current facility-administered medications for this visit.        Past Medical History:   Diagnosis Date    COVID-19 virus infection 1/13/2021    Hip dysplasia        Past Surgical History:   Procedure Laterality Date    CATARACT REMOVAL Bilateral 2006    COLONOSCOPY  [id number]    HIP SURGERY      JOINT REPLACEMENT Right 2009    hip replacement    RETINAL DETACHMENT SURGERY Bilateral     x2       Social History     Socioeconomic History    Marital status:      Spouse name: Not on file    Number of children: 2    Years of education: Not on file    Highest education level: Not on file   Occupational History    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 [employer]   Tobacco Use    Smoking status: Never Smoker    Smokeless tobacco: Never Used   Vaping Use  Vaping Use: Never used   Substance and Sexual Activity    Alcohol use: Yes     Alcohol/week: 2.0 standard drinks     Types: 1 Glasses of wine, 10 Cans of beer per week    Drug use: No    Sexual activity: Yes     Partners: Female   Other Topics Concern    Not on file   Social History Narrative    Retired--does patient play     remarried and doing well--kids close(has grandchildren)    Exercise daily and golfs     Social Determinants of Health     Financial Resource Strain: Low Risk     Difficulty of Paying Living Expenses: Not hard at all   Food Insecurity: No Food Insecurity    Worried About Running Out of Food in the Last Year: Never true    [address] in the Last Year: Never true   Transportation Needs:     Lack of Transportation (Medical): Not on file    Lack of Transportation (Non-Medical):  Not on file   Physical Activity:     Days of Exercise per Week: Not on file    Minutes of Exercise per Session: Not on file   Stress:     Feeling of Stress : Not on file   Social Connections:     Frequency of Communication with Friends and Family: Not on file    Frequency of Social Gatherings with Friends and Family: Not on file    Attends Scientology Services: Not on file    Active Member of [address] [place] [address] [employer] or Organizations: Not on file    Attends Club or Organization Meetings: Not on file    Marital Status: Not on file   Intimate Partner Violence:     Fear of Current or Ex-Partner: Not on file    Emotionally Abused: Not on file    Physically Abused: Not on file    Sexually Abused: Not on file   Housing Stability:     Unable to Pay for Housing in the Last Year: Not on file    Number of [place] in the Last Year: Not on file    Unstable Housing in the Last Year: Not on file       Family History   Problem Relation Age of Onset    Other Mother         SLE    Lupus Mother     Heart Disease Father     High Blood Pressure Brother     Stroke Brother     Other Brother         pheochromocytoma Immunization History   Administered Date(s) Administered    COVID-19, Moderna, Primary or Immunocompromised, PF, 100mcg/0.5mL 02/24/2021, 03/24/2021, 12/21/2021    Influenza Virus Vaccine 11/12/2014    Influenza, Quadv, adjuvanted, 65 yrs +, IM, PF (Fluad) 10/07/2020, 09/30/2021    Pneumococcal Conjugate 13-valent ([id number]) 08/19/2019    Pneumococcal Polysaccharide ([id number]) 10/07/2020    Tdap (Boostrix, Adacel) 09/26/2008, 03/20/2018    Zoster Live (Zostavax) 12/03/2014    Zoster Recombinant (Shingrix) 10/07/2020, 02/02/2021       Review of Systems  Review of Systems   Constitutional: Negative for fever and weight loss. Cardiovascular: Negative for chest pain. Gastrointestinal: Negative for abdominal pain and bowel incontinence. Genitourinary: Negative for bladder incontinence, dysuria and pelvic pain. Musculoskeletal: Positive for back pain. Neurological: Negative for tingling, weakness, numbness, headaches and paresthesias. Objective:   Physical Exam  Physical Exam  Vitals reviewed. Constitutional:       General: He is not in acute distress. Appearance: He is well-developed. Eyes:      Conjunctiva/sclera: Conjunctivae normal.      Pupils: Pupils are equal, round, and reactive to light. Neck:      Thyroid: No thyromegaly. Vascular: No JVD. Trachea: No tracheal deviation. Cardiovascular:      Rate and Rhythm: Normal rate and regular rhythm. Heart sounds: Normal heart sounds. No murmur heard. No gallop. Pulmonary:      Effort: Pulmonary effort is normal. No respiratory distress. Breath sounds: Normal breath sounds. No stridor. No wheezing or rales. Chest:      Chest wall: No tenderness. Abdominal:      General: Bowel sounds are normal. There is no distension. Palpations: Abdomen is soft. There is no mass. Tenderness: There is no abdominal tenderness. Musculoskeletal:         General: No tenderness.    Lymphadenopathy: Cervical: No cervical adenopathy. Skin:     General: Skin is warm and dry. Coloration: Skin is not pale. Findings: No erythema or rash. Neurological:      Mental Status: He is alert and oriented to person, place, and time. Cranial Nerves: No cranial nerve deficit. Motor: No abnormal muscle tone. Coordination: Coordination normal.      Deep Tendon Reflexes: Reflexes normal.   Psychiatric:         Behavior: Behavior normal.         Thought Content:  Thought content normal.         Judgment: Judgment normal.         Assessment and Plan:     Elevated blood pressure reading   Well-controlled, continue current medications    Back pain of lumbar region with sciatica   Well-controlled, continue current medications

## 2022-05-25 ENCOUNTER — OFFICE VISIT (OUTPATIENT)
Dept: FAMILY MEDICINE CLINIC | Age: 67
End: 2022-05-25
Payer: COMMERCIAL

## 2022-05-25 VITALS
OXYGEN SATURATION: 98 % | BODY MASS INDEX: 26.77 KG/M2 | HEART RATE: 77 BPM | WEIGHT: 187 LBS | HEIGHT: 70 IN | DIASTOLIC BLOOD PRESSURE: 80 MMHG | SYSTOLIC BLOOD PRESSURE: 130 MMHG

## 2022-05-25 DIAGNOSIS — Z00.00 WELL ADULT EXAM: ICD-10-CM

## 2022-05-25 DIAGNOSIS — Z00.00 WELL ADULT EXAM: Primary | ICD-10-CM

## 2022-05-25 DIAGNOSIS — R03.0 ELEVATED BLOOD PRESSURE READING: ICD-10-CM

## 2022-05-25 LAB
A/G RATIO: 1.3 (ref 1.1–2.2)
ALBUMIN SERPL-MCNC: 4.2 G/DL (ref 3.4–5)
ALP BLD-CCNC: 80 U/L (ref 40–129)
ALT SERPL-CCNC: 25 U/L (ref 10–40)
ANION GAP SERPL CALCULATED.3IONS-SCNC: 14 MMOL/L (ref 3–16)
AST SERPL-CCNC: 29 U/L (ref 15–37)
BASOPHILS ABSOLUTE: 0 K/UL (ref 0–0.2)
BASOPHILS RELATIVE PERCENT: 0.3 %
BILIRUB SERPL-MCNC: 0.5 MG/DL (ref 0–1)
BUN BLDV-MCNC: 22 MG/DL (ref 7–20)
CALCIUM SERPL-MCNC: 9.2 MG/DL (ref 8.3–10.6)
CHLORIDE BLD-SCNC: 103 MMOL/L (ref 99–110)
CHOLESTEROL, TOTAL: 184 MG/DL (ref 0–199)
CO2: 24 MMOL/L (ref 21–32)
CREAT SERPL-MCNC: 0.9 MG/DL (ref 0.8–1.3)
EOSINOPHILS ABSOLUTE: 0.2 K/UL (ref 0–0.6)
EOSINOPHILS RELATIVE PERCENT: 4.1 %
GFR AFRICAN AMERICAN: >60
GFR NON-AFRICAN AMERICAN: >60
GLUCOSE BLD-MCNC: 99 MG/DL (ref 70–99)
HCT VFR BLD CALC: 43.2 % (ref 40.5–52.5)
HDLC SERPL-MCNC: 61 MG/DL (ref 40–60)
HEMOGLOBIN: 14.4 G/DL (ref 13.5–17.5)
LDL CHOLESTEROL CALCULATED: 108 MG/DL
LYMPHOCYTES ABSOLUTE: 1.7 K/UL (ref 1–5.1)
LYMPHOCYTES RELATIVE PERCENT: 33.8 %
MCH RBC QN AUTO: 29.6 PG (ref 26–34)
MCHC RBC AUTO-ENTMCNC: 33.4 G/DL (ref 31–36)
MCV RBC AUTO: 88.4 FL (ref 80–100)
MONOCYTES ABSOLUTE: 0.6 K/UL (ref 0–1.3)
MONOCYTES RELATIVE PERCENT: 11.2 %
NEUTROPHILS ABSOLUTE: 2.6 K/UL (ref 1.7–7.7)
NEUTROPHILS RELATIVE PERCENT: 50.6 %
PDW BLD-RTO: 14.3 % (ref 12.4–15.4)
PLATELET # BLD: 221 K/UL (ref 135–450)
PMV BLD AUTO: 8.1 FL (ref 5–10.5)
POTASSIUM SERPL-SCNC: 4.5 MMOL/L (ref 3.5–5.1)
RBC # BLD: 4.89 M/UL (ref 4.2–5.9)
SODIUM BLD-SCNC: 141 MMOL/L (ref 136–145)
TOTAL PROTEIN: 7.4 G/DL (ref 6.4–8.2)
TRIGL SERPL-MCNC: 77 MG/DL (ref 0–150)
TSH SERPL DL<=0.05 MIU/L-ACNC: 3.71 UIU/ML (ref 0.27–4.2)
VLDLC SERPL CALC-MCNC: 15 MG/DL
WBC # BLD: 5.1 K/UL (ref 4–11)

## 2022-05-25 PROCEDURE — 99397 PER PM REEVAL EST PAT 65+ YR: CPT | Performed by: FAMILY MEDICINE

## 2022-05-25 ASSESSMENT — PATIENT HEALTH QUESTIONNAIRE - PHQ9
SUM OF ALL RESPONSES TO PHQ QUESTIONS 1-9: 0
1. LITTLE INTEREST OR PLEASURE IN DOING THINGS: 0
SUM OF ALL RESPONSES TO PHQ QUESTIONS 1-9: 0
SUM OF ALL RESPONSES TO PHQ QUESTIONS 1-9: 0
2. FEELING DOWN, DEPRESSED OR HOPELESS: 0
SUM OF ALL RESPONSES TO PHQ QUESTIONS 1-9: 0
SUM OF ALL RESPONSES TO PHQ9 QUESTIONS 1 & 2: 0

## 2022-05-25 NOTE — PROGRESS NOTES
History and Physical      [facility]  YOB: 1955    Date of Service:  5/25/2022    Chief Complaint:   Betty Abel is a 78 y.o. male who presents for complete physical examination.     HPI: cpx  No cc    Wt Readings from Last 3 Encounters:   05/25/22 187 lb (84.8 kg)   03/07/22 190 lb 12.8 oz (86.5 kg)   12/06/21 193 lb 3.2 oz (87.6 kg)     BP Readings from Last 3 Encounters:   05/25/22 130/80   03/07/22 110/70   12/06/21 138/84       Patient Active Problem List   Diagnosis    Well adult exam    Neck pain on left side    Elevated blood pressure reading    Acquired ptosis of both eyelids    Arthralgia of both hands    COVID-19 virus infection    Arthritis of knee    Back pain of lumbar region with sciatica       Preventive Care:  Health Maintenance   Topic Date Due    Prostate Specific Antigen (PSA) Screening or Monitoring  02/18/2014    Depression Screen  03/07/2023    Colorectal Cancer Screen  06/10/2024    Lipids  01/13/2026    DTaP/Tdap/Td vaccine (3 - Td or Tdap) 03/20/2028    Flu vaccine  Completed    Shingles vaccine  Completed    Pneumococcal 65+ years Vaccine  Completed    COVID-19 Vaccine  Completed    Hepatitis C screen  Completed    Hepatitis A vaccine  Aged Out    Hepatitis B vaccine  Aged Out    Hib vaccine  Aged Out    Meningococcal (ACWY) vaccine  Aged Out      Self-testicular exams: Yes  Sexual activity: single partner, contraception - none   Last eye exam: 2021, normal  Exercise: walks 7 time(s) per week  Seatbelt use: +  Lipid panel:    Lab Results   Component Value Date    CHOL 196 01/13/2021    TRIG 42 01/13/2021    HDL 61 (H) 01/13/2021    LDLCALC 127 (H) 01/13/2021       Living will: yes,   copy requested    Immunization History   Administered Date(s) Administered    Nayely PHILLIP, Primary or Immunocompromised, PF, 100mcg/0.5mL 02/24/2021, 03/24/2021, 12/21/2021    Influenza Virus Vaccine 11/12/2014    Influenza, Quadv, adjuvanted, 65 yrs +, IM, PF (Fluad) 10/07/2020, 09/30/2021    Pneumococcal Conjugate 13-valent ([id number]) 08/19/2019    Pneumococcal Polysaccharide ([id number]) 10/07/2020    Tdap (Boostrix, Adacel) 09/26/2008, 03/20/2018    Zoster Live (Zostavax) 12/03/2014    Zoster Recombinant (Shingrix) 10/07/2020, 02/02/2021       No Known Allergies  Outpatient Medications Marked as Taking for the 5/25/22 encounter (Office Visit) with Geno Sofia MD   Medication Sig Dispense Refill    TURMERIC CURCUMIN PO Take by mouth      KRILL OIL PO Take by mouth      Ascorbic Acid (VITAMIN C) 500 MG tablet Take 1,000 mg by mouth daily       multivitamin (THERAGRAN) per tablet Take 1 tablet by mouth daily.  vitamin E 1000 UNITS capsule Take 1,000 Units by mouth daily.  Cholecalciferol (VITAMIN D-3) 5000 UNITS TABS Take  by mouth. Past Medical History:   Diagnosis Date    COVID-19 virus infection 1/13/2021    Hip dysplasia      Past Surgical History:   Procedure Laterality Date    CATARACT REMOVAL Bilateral 2006    COLONOSCOPY  [id number]    HIP SURGERY      JOINT REPLACEMENT Right 2009    hip replacement    RETINAL DETACHMENT SURGERY Bilateral     x2     Family History   Problem Relation Age of Onset    Other Mother         SLE    Lupus Mother     Heart Disease Father     High Blood Pressure Brother     Stroke Brother     Other Brother         pheochromocytoma     Social History     Socioeconomic History    Marital status:      Spouse name: Not on file    Number of children: 2    Years of education: Not on file    Highest education level: Not on file   Occupational History    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 [employer]   Tobacco Use    Smoking status: Never Smoker    Smokeless tobacco: Never Used   Vaping Use    Vaping Use: Never used   Substance and Sexual Activity    Alcohol use: Yes     Alcohol/week: 2.0 standard drinks     Types: 1 Glasses of wine, 10 Cans of beer per week    Drug use:  No  Sexual activity: Yes     Partners: Female   Other Topics Concern    Not on file   Social History Narrative    Retired--does patient play     remarried and doing well--kids close(has grandchildren)    Exercise daily and golfs     Social Determinants of Health     Financial Resource Strain: Low Risk     Difficulty of Paying Living Expenses: Not hard at all   Food Insecurity: No Food Insecurity    Worried About Running Out of Food in the Last Year: Never true    [address] in the Last Year: Never true   Transportation Needs:     Lack of Transportation (Medical): Not on file    Lack of Transportation (Non-Medical): Not on file   Physical Activity:     Days of Exercise per Week: Not on file    Minutes of Exercise per Session: Not on file   Stress:     Feeling of Stress : Not on file   Social Connections:     Frequency of Communication with Friends and Family: Not on file    Frequency of Social Gatherings with Friends and Family: Not on file    Attends Hinduism Services: Not on file    Active Member of [address] [place] [address] [employer] or Organizations: Not on file    Attends Club or Organization Meetings: Not on file    Marital Status: Not on file   Intimate Partner Violence:     Fear of Current or Ex-Partner: Not on file    Emotionally Abused: Not on file    Physically Abused: Not on file    Sexually Abused: Not on file   Housing Stability:     Unable to Pay for Housing in the Last Year: Not on file    Number of [place] in the Last Year: Not on file    Unstable Housing in the Last Year: Not on file       Review of Systems:  A comprehensive review of systems was negative except for what was noted in the HPI. Physical Exam:   Vitals:    05/25/22 0912   BP: 130/80   Pulse: 77   SpO2: 98%   Weight: 187 lb (84.8 kg)   Height: 5' 10\" (1.778 m)     Body mass index is 26.83 kg/m². Constitutional: He is oriented to person, place, and time. He appears well-developed and well-nourished. No distress.    HEENT:   Head: Normocephalic and atraumatic. Right Ear: Tympanic membrane, external ear and ear canal normal.   Left Ear: Tympanic membrane, external ear and ear canal normal.   Nose: Nose normal.   Mouth/Throat: Oropharynx is clear and moist and mucous membranes are normal. No oropharyngeal exudate or posterior oropharyngeal erythema. He has no cervical adenopathy. Eyes: Conjunctivae and extraocular motions are normal. Pupils are equal, round, and reactive to light. Neck: Full passive range of motion without pain. Neck supple. No JVD present. Carotid bruit is not present. No mass and no thyromegaly present. Cardiovascular: Normal rate, regular rhythm, normal heart sounds and intact distal pulses. Exam reveals no gallop and no friction rub. No murmur heard. Pulmonary/Chest: Effort normal and breath sounds normal. No respiratory distress. He has no wheezes, rhonchi or rales. Abdominal: Soft, non-tender. Bowel sounds and aorta are normal. There is no organomegaly, mass or bruit. Genitourinary:  genitals normal without hernia or inguinal adenopathy, rectal normal, no masses, prostate normal in size without masses or nodules, no inguinal lymphadenopathy. Musculoskeletal: Normal range of motion, no synovitis. He exhibits no edema. Neurological: He is alert and oriented to person, place, and time. He has normal reflexes. No cranial nerve deficit. Coordination normal.   Skin: Skin is warm, dry and intact. No suspicious lesions are noted. Psychiatric: He has a normal mood and affect.  His speech is normal and behavior is normal. Judgment, cognition and memory are normal.     Assessment/Plan:    Niranjan Villaseñor was seen today for annual exam.    Diagnoses and all orders for this visit:    Elevated blood pressure reading    Well adult exam

## 2022-09-27 ENCOUNTER — APPOINTMENT (RX ONLY)
Dept: URBAN - METROPOLITAN AREA CLINIC 170 | Facility: CLINIC | Age: 67
Setting detail: DERMATOLOGY
End: 2022-09-27

## 2022-09-27 DIAGNOSIS — D18.0 HEMANGIOMA: ICD-10-CM | Status: STABLE

## 2022-09-27 DIAGNOSIS — L21.8 OTHER SEBORRHEIC DERMATITIS: ICD-10-CM | Status: INADEQUATELY CONTROLLED

## 2022-09-27 DIAGNOSIS — L81.4 OTHER MELANIN HYPERPIGMENTATION: ICD-10-CM | Status: STABLE

## 2022-09-27 DIAGNOSIS — L82.1 OTHER SEBORRHEIC KERATOSIS: ICD-10-CM | Status: STABLE

## 2022-09-27 DIAGNOSIS — L28.1 PRURIGO NODULARIS: ICD-10-CM | Status: INADEQUATELY CONTROLLED

## 2022-09-27 DIAGNOSIS — D22 MELANOCYTIC NEVI: ICD-10-CM | Status: STABLE

## 2022-09-27 PROBLEM — D18.01 HEMANGIOMA OF SKIN AND SUBCUTANEOUS TISSUE: Status: ACTIVE | Noted: 2022-09-27

## 2022-09-27 PROBLEM — D48.5 NEOPLASM OF UNCERTAIN BEHAVIOR OF SKIN: Status: ACTIVE | Noted: 2022-09-27

## 2022-09-27 PROBLEM — D22.5 MELANOCYTIC NEVI OF TRUNK: Status: ACTIVE | Noted: 2022-09-27

## 2022-09-27 PROCEDURE — ? TREATMENT REGIMEN

## 2022-09-27 PROCEDURE — 11102 TANGNTL BX SKIN SINGLE LES: CPT

## 2022-09-27 PROCEDURE — ? SUNSCREEN RECOMMENDATIONS

## 2022-09-27 PROCEDURE — ? FULL BODY SKIN EXAM

## 2022-09-27 PROCEDURE — 99214 OFFICE O/P EST MOD 30 MIN: CPT | Mod: 25

## 2022-09-27 PROCEDURE — ? PRESCRIPTION

## 2022-09-27 PROCEDURE — ? COUNSELING

## 2022-09-27 PROCEDURE — ? BIOPSY BY SHAVE METHOD

## 2022-09-27 PROCEDURE — ? ADDITIONAL NOTES

## 2022-09-27 RX ORDER — KETOCONAZOLE 20 MG/G
CREAM TOPICAL
Qty: 60 | Refills: 5 | Status: ERX | COMMUNITY
Start: 2022-09-27

## 2022-09-27 RX ORDER — FLUOCINONIDE GEL 0.5 MG/G
GEL TOPICAL
Qty: 60 | Refills: 1 | Status: ERX | COMMUNITY
Start: 2022-09-27

## 2022-09-27 RX ADMIN — KETOCONAZOLE 1: 20 CREAM TOPICAL at 00:00

## 2022-09-27 RX ADMIN — FLUOCINONIDE GEL 1: 0.5 GEL TOPICAL at 00:00

## 2022-09-27 ASSESSMENT — LOCATION SIMPLE DESCRIPTION DERM
LOCATION SIMPLE: RIGHT LOWER BACK
LOCATION SIMPLE: POSTERIOR NECK
LOCATION SIMPLE: UPPER BACK
LOCATION SIMPLE: RIGHT SHOULDER
LOCATION SIMPLE: CHIN
LOCATION SIMPLE: RIGHT UPPER BACK
LOCATION SIMPLE: RIGHT ELBOW
LOCATION SIMPLE: RIGHT EYEBROW
LOCATION SIMPLE: INFERIOR FOREHEAD
LOCATION SIMPLE: ABDOMEN
LOCATION SIMPLE: RIGHT CHEEK
LOCATION SIMPLE: LEFT EYEBROW
LOCATION SIMPLE: LEFT CHEEK

## 2022-09-27 ASSESSMENT — LOCATION ZONE DERM
LOCATION ZONE: ARM
LOCATION ZONE: NECK
LOCATION ZONE: FACE
LOCATION ZONE: TRUNK

## 2022-09-27 ASSESSMENT — LOCATION DETAILED DESCRIPTION DERM
LOCATION DETAILED: RIGHT MID-UPPER BACK
LOCATION DETAILED: RIGHT LATERAL ELBOW
LOCATION DETAILED: LEFT MEDIAL MALAR CHEEK
LOCATION DETAILED: LEFT CHIN
LOCATION DETAILED: LEFT CENTRAL EYEBROW
LOCATION DETAILED: RIGHT CENTRAL EYEBROW
LOCATION DETAILED: RIGHT ANTERIOR SHOULDER
LOCATION DETAILED: INFERIOR MID FOREHEAD
LOCATION DETAILED: INFERIOR THORACIC SPINE
LOCATION DETAILED: RIGHT INFERIOR MEDIAL MALAR CHEEK
LOCATION DETAILED: MID POSTERIOR NECK
LOCATION DETAILED: RIGHT INFERIOR MEDIAL MIDBACK
LOCATION DETAILED: EPIGASTRIC SKIN

## 2022-09-27 ASSESSMENT — SEVERITY ASSESSMENT: HOW SEVERE IS THIS PATIENT'S CONDITION?: ALMOST CLEAR

## 2022-09-27 NOTE — PROCEDURE: BIOPSY BY SHAVE METHOD
Detail Level: Detailed
Depth Of Biopsy: dermis
Was A Bandage Applied: Yes
Size Of Lesion In Cm: 0.3
X Size Of Lesion In Cm: 0
Anticipated Plan (Based On Presumed Biopsy Results): Call with results
Biopsy Type: H and E
Biopsy Method: double edge Personna blade
Anesthesia Type: 1% Xylocaine with epinephrine
Anesthesia Volume In Cc (Will Not Render If 0): 2
Hemostasis: Aluminum Chloride and Electrocautery
Wound Care: Bacitracin
Dressing: Band-Aid
Destruction After The Procedure: No
Type Of Destruction Used: Electrodesiccation and Curettage
Curettage Text: The wound bed was treated with curettage after the biopsy was performed.
Cryotherapy Text: The wound bed was treated with cryotherapy after the biopsy was performed.
Electrodesiccation Text: The wound bed was treated with electrodesiccation after the biopsy was performed.
Electrodesiccation And Curettage Text: The wound bed was treated with electrodesiccation and curettage after the biopsy was performed.
Silver Nitrate Text: The wound bed was treated with silver nitrate after the biopsy was performed.
Lab: -[id number]
Lab Facility: [id number]
Path Notes (To The Dermatopathologist): Check margins
Consent: Written consent was obtained and risks were reviewed including but not limited to scarring, infection, bleeding, scabbing, incomplete removal, nerve damage and allergy to anesthesia.
Post-Care Instructions: I reviewed with the patient in detail post-care instructions. Patient is to keep the biopsy site dry overnight, and then apply polysporin, vaseline or aquaphor twice daily until healed.
Notification Instructions: Patient will be notified of biopsy results. However, patient instructed to call the office if not contacted within 2 weeks.
Billing Type: Third-Party Bill
Information: Selecting Yes will display possible errors in your note based on the variables you have selected. This validation is only offered as a suggestion for you. PLEASE NOTE THAT THE VALIDATION TEXT WILL BE REMOVED WHEN YOU FINALIZE YOUR NOTE. IF YOU WANT TO FAX A PRELIMINARY NOTE YOU WILL NEED TO TOGGLE THIS TO 'NO' IF YOU DO NOT WANT IT IN YOUR FAXED NOTE.

## 2022-09-27 NOTE — PROCEDURE: COUNSELING
Detail Level: Generalized
Detail Level: Detailed
Topical Steroid Recommendations: Ok to use OTC HCT 1-2x daily x 1 week PRN flares
Detail Level: Simple

## 2022-09-27 NOTE — HPI: EVALUATION OF SKIN LESION(S)
What Type Of Note Output Would You Prefer (Optional)?: Standard Output
Hpi Title: Evaluation of Skin Lesions
Have Your Spot(S) Been Treated In The Past?: has not been treated
Additional History: Patient states he has some areas around his neck

## 2023-09-12 ENCOUNTER — APPOINTMENT (RX ONLY)
Dept: URBAN - METROPOLITAN AREA CLINIC 170 | Facility: CLINIC | Age: 68
Setting detail: DERMATOLOGY
End: 2023-09-12

## 2023-09-12 DIAGNOSIS — L28.1 PRURIGO NODULARIS: ICD-10-CM | Status: INADEQUATELY CONTROLLED

## 2023-09-12 DIAGNOSIS — D18.0 HEMANGIOMA: ICD-10-CM | Status: STABLE

## 2023-09-12 DIAGNOSIS — L82.1 OTHER SEBORRHEIC KERATOSIS: ICD-10-CM | Status: STABLE

## 2023-09-12 DIAGNOSIS — L21.8 OTHER SEBORRHEIC DERMATITIS: ICD-10-CM | Status: INADEQUATELY CONTROLLED

## 2023-09-12 DIAGNOSIS — L57.0 ACTINIC KERATOSIS: ICD-10-CM | Status: INADEQUATELY CONTROLLED

## 2023-09-12 DIAGNOSIS — D22 MELANOCYTIC NEVI: ICD-10-CM | Status: STABLE

## 2023-09-12 DIAGNOSIS — L81.4 OTHER MELANIN HYPERPIGMENTATION: ICD-10-CM | Status: STABLE

## 2023-09-12 PROBLEM — D22.5 MELANOCYTIC NEVI OF TRUNK: Status: ACTIVE | Noted: 2023-09-12

## 2023-09-12 PROBLEM — D48.5 NEOPLASM OF UNCERTAIN BEHAVIOR OF SKIN: Status: ACTIVE | Noted: 2023-09-12

## 2023-09-12 PROBLEM — D18.01 HEMANGIOMA OF SKIN AND SUBCUTANEOUS TISSUE: Status: ACTIVE | Noted: 2023-09-12

## 2023-09-12 PROCEDURE — 17000 DESTRUCT PREMALG LESION: CPT | Mod: 59

## 2023-09-12 PROCEDURE — ? SUNSCREEN RECOMMENDATIONS

## 2023-09-12 PROCEDURE — ? TREATMENT REGIMEN

## 2023-09-12 PROCEDURE — ? LIQUID NITROGEN

## 2023-09-12 PROCEDURE — ? BIOPSY BY SHAVE METHOD

## 2023-09-12 PROCEDURE — ? PRESCRIPTION

## 2023-09-12 PROCEDURE — 99214 OFFICE O/P EST MOD 30 MIN: CPT | Mod: 25

## 2023-09-12 PROCEDURE — ? PHOTO-DOCUMENTATION

## 2023-09-12 PROCEDURE — ? COUNSELING

## 2023-09-12 PROCEDURE — ? FULL BODY SKIN EXAM

## 2023-09-12 PROCEDURE — 11102 TANGNTL BX SKIN SINGLE LES: CPT

## 2023-09-12 RX ORDER — KETOCONAZOLE 20 MG/G
CREAM TOPICAL
Qty: 60 | Refills: 5 | Status: ERX

## 2023-09-12 RX ORDER — FLUOCINONIDE 0.5 MG/G
GEL TOPICAL
Qty: 60 | Refills: 1 | Status: ERX

## 2023-09-12 ASSESSMENT — LOCATION SIMPLE DESCRIPTION DERM
LOCATION SIMPLE: RIGHT LOWER BACK
LOCATION SIMPLE: UPPER BACK
LOCATION SIMPLE: INFERIOR FOREHEAD
LOCATION SIMPLE: LEFT KNEE
LOCATION SIMPLE: RIGHT SHOULDER
LOCATION SIMPLE: RIGHT FOREHEAD
LOCATION SIMPLE: POSTERIOR NECK
LOCATION SIMPLE: CHIN
LOCATION SIMPLE: LEFT EYEBROW
LOCATION SIMPLE: LEFT CHEEK
LOCATION SIMPLE: LEFT THIGH
LOCATION SIMPLE: ABDOMEN
LOCATION SIMPLE: RIGHT CHEEK
LOCATION SIMPLE: RIGHT EYEBROW

## 2023-09-12 ASSESSMENT — LOCATION DETAILED DESCRIPTION DERM
LOCATION DETAILED: RIGHT INFERIOR FOREHEAD
LOCATION DETAILED: RIGHT CENTRAL EYEBROW
LOCATION DETAILED: RIGHT ANTERIOR SHOULDER
LOCATION DETAILED: RIGHT INFERIOR MEDIAL MIDBACK
LOCATION DETAILED: LEFT CHIN
LOCATION DETAILED: LEFT CENTRAL EYEBROW
LOCATION DETAILED: LEFT MEDIAL MALAR CHEEK
LOCATION DETAILED: LEFT ANTERIOR DISTAL THIGH
LOCATION DETAILED: RIGHT INFERIOR MEDIAL MALAR CHEEK
LOCATION DETAILED: INFERIOR THORACIC SPINE
LOCATION DETAILED: EPIGASTRIC SKIN
LOCATION DETAILED: LEFT KNEE
LOCATION DETAILED: MID POSTERIOR NECK
LOCATION DETAILED: INFERIOR MID FOREHEAD

## 2023-09-12 ASSESSMENT — LOCATION ZONE DERM
LOCATION ZONE: TRUNK
LOCATION ZONE: LEG
LOCATION ZONE: FACE
LOCATION ZONE: NECK
LOCATION ZONE: ARM

## 2023-09-12 NOTE — HPI: EVALUATION OF SKIN LESION(S)
What Type Of Note Output Would You Prefer (Optional)?: Bullet Format
Hpi Title: Evaluation of Skin Lesions
How Severe Are Your Spot(S)?: mild
Have Your Spot(S) Been Treated In The Past?: has not been treated
Additional History: Pt has several spots they would like checked out. Left thigh and left neck, lesions will not heal properly, showed up in mid August while pt was at [facility]. Several similar spots are on the face as well.\\n\\nPts seb derm is flaring. Ketoconazole works but if pt stops using it seb derm flares again.

## 2023-09-12 NOTE — PROCEDURE: COUNSELING
Detail Level: Generalized
Topical Steroid Recommendations: Ok to use OTC HCT 1-2x daily x 1 week PRN flares
Detail Level: Simple
Detail Level: Detailed

## 2023-09-12 NOTE — PROCEDURE: BIOPSY BY SHAVE METHOD
Detail Level: Detailed
Depth Of Biopsy: dermis
Was A Bandage Applied: Yes
Size Of Lesion In Cm: 0.5
X Size Of Lesion In Cm: 0
Biopsy Type: H and E
Biopsy Method: double edge Personna blade
Anesthesia Type: 1% lidocaine with epinephrine
Anesthesia Volume In Cc (Will Not Render If 0): 1
Hemostasis: Aluminum Chloride and Electrocautery
Wound Care: Bacitracin
Dressing: Band-Aid
Destruction After The Procedure: No
Type Of Destruction Used: Electrodesiccation and Curettage
Curettage Text: The wound bed was treated with curettage after the biopsy was performed.
Cryotherapy Text: The wound bed was treated with cryotherapy after the biopsy was performed.
Electrodesiccation Text: The wound bed was treated with electrodesiccation after the biopsy was performed.
Electrodesiccation And Curettage Text: The wound bed was treated with electrodesiccation and curettage after the biopsy was performed.
Silver Nitrate Text: The wound bed was treated with silver nitrate after the biopsy was performed.
Lab: -102
Lab Facility: [id number]
Consent: Written consent was obtained and risks were reviewed including but not limited to scarring, infection, bleeding, scabbing, incomplete removal, nerve damage and allergy to anesthesia.
Post-Care Instructions: I reviewed with the patient in detail post-care instructions. Patient is to keep the biopsy site dry overnight, and then apply polysporin, vaseline or aquaphor twice daily until healed.
Notification Instructions: Patient will be notified of biopsy results. However, patient instructed to call the office if not contacted within 2 weeks.
Billing Type: Third-Party Bill
Information: Selecting Yes will display possible errors in your note based on the variables you have selected. This validation is only offered as a suggestion for you. PLEASE NOTE THAT THE VALIDATION TEXT WILL BE REMOVED WHEN YOU FINALIZE YOUR NOTE. IF YOU WANT TO FAX A PRELIMINARY NOTE YOU WILL NEED TO TOGGLE THIS TO 'NO' IF YOU DO NOT WANT IT IN YOUR FAXED NOTE.

## 2023-09-12 NOTE — PROCEDURE: LIQUID NITROGEN
Detail Level: Detailed
Number Of Freeze-Thaw Cycles: 2 freeze-thaw cycles
Show Aperture Variable?: Yes
Consent: The patient's consent was obtained including but not limited to risks of crusting, scabbing, blistering, scarring, darker or lighter pigmentary change, recurrence, incomplete removal and infection.
Duration Of Freeze Thaw-Cycle (Seconds): 0
Render Note In Bullet Format When Appropriate: No
Post-Care Instructions: I reviewed with the patient in detail post-care instructions. Patient is to wear sunprotection, and avoid picking at any of the treated lesions. Pt may apply Vaseline to crusted or scabbing areas.

## 2023-09-12 NOTE — PROCEDURE: MIPS QUALITY
Detail Level: Detailed
Quality 130: Documentation Of Current Medications In The Medical Record: Current Medications Documented
Quality 110: Preventive Care And Screening: Influenza Immunization: Influenza Immunization previously received during influenza season
Quality 226: Preventive Care And Screening: Tobacco Use: Screening And Cessation Intervention: Patient screened for tobacco use and is an ex/non-smoker
Quality 111:Pneumonia Vaccination Status For Older Adults: Patient received any pneumococcal conjugate or polysaccharide vaccine on or after their 60th birthday and before the end of the measurement period
Quality 47: Advance Care Plan: Advance Care Planning discussed and documented; advance care plan or surrogate decision maker documented in the medical record.
Quality 431: Preventive Care And Screening: Unhealthy Alcohol Use - Screening: Patient not identified as an unhealthy alcohol user when screened for unhealthy alcohol use using a systematic screening method

## 2023-09-22 ASSESSMENT — PATIENT HEALTH QUESTIONNAIRE - PHQ9
2. FEELING DOWN, DEPRESSED OR HOPELESS: 0
SUM OF ALL RESPONSES TO PHQ QUESTIONS 1-9: 0
SUM OF ALL RESPONSES TO PHQ QUESTIONS 1-9: 0
SUM OF ALL RESPONSES TO PHQ9 QUESTIONS 1 & 2: 0
SUM OF ALL RESPONSES TO PHQ QUESTIONS 1-9: 0
SUM OF ALL RESPONSES TO PHQ QUESTIONS 1-9: 0
2. FEELING DOWN, DEPRESSED OR HOPELESS: NOT AT ALL
1. LITTLE INTEREST OR PLEASURE IN DOING THINGS: 0
SUM OF ALL RESPONSES TO PHQ9 QUESTIONS 1 & 2: 0
1. LITTLE INTEREST OR PLEASURE IN DOING THINGS: NOT AT ALL

## 2023-09-25 ENCOUNTER — OFFICE VISIT (OUTPATIENT)
Dept: FAMILY MEDICINE CLINIC | Age: 68
End: 2023-09-25
Payer: MEDICARE

## 2023-09-25 VITALS
HEIGHT: 70 IN | DIASTOLIC BLOOD PRESSURE: 80 MMHG | SYSTOLIC BLOOD PRESSURE: 130 MMHG | HEART RATE: 79 BPM | OXYGEN SATURATION: 96 % | WEIGHT: 189.4 LBS | BODY MASS INDEX: 27.11 KG/M2

## 2023-09-25 DIAGNOSIS — Z00.00 WELL ADULT EXAM: ICD-10-CM

## 2023-09-25 DIAGNOSIS — E55.9 VITAMIN D DEFICIENCY: ICD-10-CM

## 2023-09-25 DIAGNOSIS — R03.0 ELEVATED BLOOD PRESSURE READING: ICD-10-CM

## 2023-09-25 DIAGNOSIS — Z00.00 WELCOME TO MEDICARE PREVENTIVE VISIT: Primary | ICD-10-CM

## 2023-09-25 DIAGNOSIS — E78.2 MIXED HYPERLIPIDEMIA: ICD-10-CM

## 2023-09-25 DIAGNOSIS — M72.0 DUPUYTRENS CONTRACTURE: ICD-10-CM

## 2023-09-25 PROCEDURE — 1123F ACP DISCUSS/DSCN MKR DOCD: CPT | Performed by: FAMILY MEDICINE

## 2023-09-25 PROCEDURE — 3017F COLORECTAL CA SCREEN DOC REV: CPT | Performed by: FAMILY MEDICINE

## 2023-09-25 PROCEDURE — G0402 INITIAL PREVENTIVE EXAM: HCPCS | Performed by: FAMILY MEDICINE

## 2023-09-25 SDOH — ECONOMIC STABILITY: FOOD INSECURITY: WITHIN THE PAST 12 MONTHS, YOU WORRIED THAT YOUR FOOD WOULD RUN OUT BEFORE YOU GOT MONEY TO BUY MORE.: NEVER TRUE

## 2023-09-25 SDOH — ECONOMIC STABILITY: FOOD INSECURITY: WITHIN THE PAST 12 MONTHS, THE FOOD YOU BOUGHT JUST DIDN'T LAST AND YOU DIDN'T HAVE MONEY TO GET MORE.: NEVER TRUE

## 2023-09-25 SDOH — ECONOMIC STABILITY: INCOME INSECURITY: HOW HARD IS IT FOR YOU TO PAY FOR THE VERY BASICS LIKE FOOD, HOUSING, MEDICAL CARE, AND HEATING?: NOT HARD AT ALL

## 2023-09-25 SDOH — ECONOMIC STABILITY: HOUSING INSECURITY
IN THE LAST 12 MONTHS, WAS THERE A TIME WHEN YOU DID NOT HAVE A STEADY PLACE TO SLEEP OR SLEPT IN A SHELTER (INCLUDING NOW)?: NO

## 2023-09-25 ASSESSMENT — LIFESTYLE VARIABLES
HOW MANY STANDARD DRINKS CONTAINING ALCOHOL DO YOU HAVE ON A TYPICAL DAY: 3 OR 4
HAVE YOU OR SOMEONE ELSE BEEN INJURED AS A RESULT OF YOUR DRINKING: 0
HAS A RELATIVE, FRIEND, DOCTOR, OR ANOTHER HEALTH PROFESSIONAL EXPRESSED CONCERN ABOUT YOUR DRINKING OR SUGGESTED YOU CUT DOWN: 0
HOW OFTEN DURING THE LAST YEAR HAVE YOU FAILED TO DO WHAT WAS NORMALLY EXPECTED FROM YOU BECAUSE OF DRINKING: 0
HOW OFTEN DURING THE LAST YEAR HAVE YOU FOUND THAT YOU WERE NOT ABLE TO STOP DRINKING ONCE YOU HAD STARTED: 0
HOW OFTEN DURING THE LAST YEAR HAVE YOU BEEN UNABLE TO REMEMBER WHAT HAPPENED THE NIGHT BEFORE BECAUSE YOU HAD BEEN DRINKING: 0
HOW OFTEN DURING THE LAST YEAR HAVE YOU NEEDED AN ALCOHOLIC DRINK FIRST THING IN THE MORNING TO GET YOURSELF GOING AFTER A NIGHT OF HEAVY DRINKING: 0
HOW OFTEN DO YOU HAVE A DRINK CONTAINING ALCOHOL: 4 OR MORE TIMES A WEEK
HOW OFTEN DURING THE LAST YEAR HAVE YOU HAD A FEELING OF GUILT OR REMORSE AFTER DRINKING: 0

## 2023-09-25 NOTE — PROGRESS NOTES
Medicare Annual Wellness Visit    Neeru Jackson is here for Medicare AW    Assessment & Plan   Dupuytrens contracture  Assessment & Plan:   Well-controlled, -  Well adult exam  Assessment & Plan:   labs--shot UTD  Elevated blood pressure reading  Assessment & Plan:   Still up--recheck in 6 w-  labs    Recommendations for Preventive Services Due: see orders and patient instructions/AVS.  Recommended screening schedule for the next 5-10 years is provided to the patient in written form: see Patient Instructions/AVS.     No follow-ups on file. Subjective   The following acute and/or chronic problems were also addressed today:  Dupuytrens contracture   Well-controlled, -    Well adult exam   labs--shot UTD    Elevated blood pressure reading   Still up--recheck in 6 w-  labs      Patient's complete Health Risk Assessment and screening values have been reviewed and are found in Flowsheets. The following problems were reviewed today and where indicated follow up appointments were made and/or referrals ordered. Positive Risk Factor Screenings with Interventions:         Alcohol Screening:  Alcohol Use: Heavy Drinker (9/25/2023)    AUDIT-C     Frequency of Alcohol Consumption: 4 or more times a week     Average Number of Drinks: 3 or 4     Frequency of Binge Drinking: Never      AUDIT-C Score: 5   AUDIT Total Score: 5    Interpretation of AUDIT-C score:   3-7 indicates potential alcohol risk. 8 or more is associated with harmful or hazardous drinking. 15 or more in women, and 15 or more in men, is likely to indicate alcohol dependence. Interventions:  -    Alcohol Misuse Counseling: Patient was asked about his current pattern of alcohol consumption, and results of screening for alcohol misuse, performed within the past 12 months, have been consistent with harmful or hazardous drinking, but do not meet criteria for alcohol dependence.  Patient's individual health risks associated with alcohol misuse were

## 2023-09-25 NOTE — PATIENT INSTRUCTIONS
per week or 10,000 steps per day on a pedometer . Order or download the FREE \"Exercise & Physical Activity: Your Everyday Guide\" from [employer]. Call 6-637.863.6412 or search [employer] online. You need 1762-3477 mg of calcium and 2256-5057 IU of vitamin D per day. It is possible to meet your calcium requirement with diet alone, but a vitamin D supplement is usually necessary to meet this goal.  When exposed to the sun, use a sunscreen that protects against both UVA and UVB radiation with an SPF of 30 or greater. Reapply every 2 to 3 hours or after sweating, drying off with a towel, or swimming. Always wear a seat belt when traveling in a car. Always wear a helmet when riding a bicycle or motorcycle.

## 2023-11-03 ENCOUNTER — TELEPHONE (OUTPATIENT)
Dept: FAMILY MEDICINE CLINIC | Age: 68
End: 2023-11-03

## 2023-11-03 NOTE — TELEPHONE ENCOUNTER
----- Message from Gabi Day sent at 11/3/2023 11:27 AM EDT -----  Subject: Message to Provider    QUESTIONS  Information for Provider? Pt of Dr. Chloe Gonzales requesting return call from   practice. Stated visit from 9/25 was coding incorrectly. Coded as initial   preventative exam versus AWV.   ---------------------------------------------------------------------------  --------------  Zaira MORILLO  [phone]; OK to respond with electronic message via [employer] portal (only   for patients who have registered [employer] account)  ---------------------------------------------------------------------------  --------------  SCRIPT ANSWERS  Relationship to Patient?  Self

## 2023-11-09 ENCOUNTER — OFFICE VISIT (OUTPATIENT)
Dept: FAMILY MEDICINE CLINIC | Age: 68
End: 2023-11-09
Payer: MEDICARE

## 2023-11-09 VITALS
SYSTOLIC BLOOD PRESSURE: 140 MMHG | DIASTOLIC BLOOD PRESSURE: 80 MMHG | WEIGHT: 189.8 LBS | HEART RATE: 76 BPM | BODY MASS INDEX: 27.17 KG/M2 | HEIGHT: 70 IN | OXYGEN SATURATION: 98 %

## 2023-11-09 DIAGNOSIS — R03.0 ELEVATED BLOOD PRESSURE READING: ICD-10-CM

## 2023-11-09 PROCEDURE — G8419 CALC BMI OUT NRM PARAM NOF/U: HCPCS | Performed by: FAMILY MEDICINE

## 2023-11-09 PROCEDURE — 3017F COLORECTAL CA SCREEN DOC REV: CPT | Performed by: FAMILY MEDICINE

## 2023-11-09 PROCEDURE — 1036F TOBACCO NON-USER: CPT | Performed by: FAMILY MEDICINE

## 2023-11-09 PROCEDURE — 99213 OFFICE O/P EST LOW 20 MIN: CPT | Performed by: FAMILY MEDICINE

## 2023-11-09 PROCEDURE — G8484 FLU IMMUNIZE NO ADMIN: HCPCS | Performed by: FAMILY MEDICINE

## 2023-11-09 PROCEDURE — 1123F ACP DISCUSS/DSCN MKR DOCD: CPT | Performed by: FAMILY MEDICINE

## 2023-11-09 PROCEDURE — G8427 DOCREV CUR MEDS BY ELIG CLIN: HCPCS | Performed by: FAMILY MEDICINE

## 2023-11-09 ASSESSMENT — ENCOUNTER SYMPTOMS
BLURRED VISION: 0
ORTHOPNEA: 0
SHORTNESS OF BREATH: 0

## 2023-11-09 ASSESSMENT — PATIENT HEALTH QUESTIONNAIRE - PHQ9
SUM OF ALL RESPONSES TO PHQ9 QUESTIONS 1 & 2: 0
SUM OF ALL RESPONSES TO PHQ QUESTIONS 1-9: 0
2. FEELING DOWN, DEPRESSED OR HOPELESS: 0
SUM OF ALL RESPONSES TO PHQ QUESTIONS 1-9: 0
SUM OF ALL RESPONSES TO PHQ QUESTIONS 1-9: 0
1. LITTLE INTEREST OR PLEASURE IN DOING THINGS: 0
SUM OF ALL RESPONSES TO PHQ QUESTIONS 1-9: 0

## 2023-11-09 NOTE — PROGRESS NOTES
on file    Highest education level: Not on file   Occupational History    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 [employer]   Tobacco Use    Smoking status: Never    Smokeless tobacco: Never   Vaping Use    Vaping Use: Never used   Substance and Sexual Activity    Alcohol use: Yes     Alcohol/week: 2.0 standard drinks of alcohol     Types: 1 Glasses of wine, 10 Cans of beer per week    Drug use: No    Sexual activity: Yes     Partners: Female   Other Topics Concern    Not on file   Social History Narrative    Retired--does patient play     remarried and doing well--kids close(has grandchildren)    Exercise daily and golfs     Social Determinants of Health     Financial Resource Strain: Low Risk  (9/25/2023)    Overall Financial Resource Strain (CARDIA)     Difficulty of Paying Living Expenses: Not hard at all   Food Insecurity: No Food Insecurity (9/25/2023)    Hunger Vital Sign     Worried About Running Out of Food in the Last Year: Never true     [address] in the Last Year: Never true   Transportation Needs: Unknown (9/25/2023)    PRAPARE - Transportation     Lack of Transportation (Medical): Not on file     Lack of Transportation (Non-Medical):  No   Physical Activity: Sufficiently Active (9/25/2023)    Exercise Vital Sign     Days of Exercise per Week: 6 days     Minutes of Exercise per Session: 110 min   Stress: Not on file   Social Connections: Not on file   Intimate Partner Violence: Not on file   Housing Stability: Unknown (9/25/2023)    Housing Stability Vital Sign     Unable to Pay for Housing in the Last Year: Not on file     Number of Places Lived in the Last Year: Not on file     Unstable Housing in the Last Year: No       Family History   Problem Relation Age of Onset    Other Mother         SLE    Lupus Mother     Dementia Mother     Heart Disease Father     High Blood Pressure Brother     Stroke Brother     Other Brother         pheochromocytoma       Immunization History   Administered Date(s)

## 2023-11-13 ENCOUNTER — TELEPHONE (OUTPATIENT)
Dept: FAMILY MEDICINE CLINIC | Age: 68
End: 2023-11-13

## 2023-11-13 NOTE — TELEPHONE ENCOUNTER
Claim adjusted - charge no longer due by the patient. After reviewing the documentation, we are unable to correct this coding due to missing required elements per Medicare. I have went in and voided  from the claim. This has been voided and is no longer due by the patient. Thank You,  Jessica Frost, [address] [place] [address] [place]       [phone] (home) [phone] (work)  Called and left this information with the patient's VM.

## 2024-01-03 ENCOUNTER — OFFICE VISIT (OUTPATIENT)
Dept: FAMILY MEDICINE CLINIC | Age: 69
End: 2024-01-03
Payer: MEDICARE

## 2024-01-03 VITALS
HEART RATE: 56 BPM | OXYGEN SATURATION: 98 % | SYSTOLIC BLOOD PRESSURE: 122 MMHG | WEIGHT: 193 LBS | BODY MASS INDEX: 27.69 KG/M2 | DIASTOLIC BLOOD PRESSURE: 84 MMHG

## 2024-01-03 DIAGNOSIS — R35.0 URINARY FREQUENCY: ICD-10-CM

## 2024-01-03 DIAGNOSIS — R35.0 URINARY FREQUENCY: Primary | ICD-10-CM

## 2024-01-03 DIAGNOSIS — E07.9 PALPABLE ABNORMALITY OF THYROID GLAND: ICD-10-CM

## 2024-01-03 DIAGNOSIS — R03.0 ELEVATED BLOOD PRESSURE READING: ICD-10-CM

## 2024-01-03 LAB
BILIRUBIN, POC: NORMAL
BLOOD URINE, POC: NORMAL
CLARITY, POC: NORMAL
COLOR, POC: NORMAL
GLUCOSE URINE, POC: NORMAL
KETONES, POC: NORMAL
LEUKOCYTE EST, POC: NORMAL
NITRITE, POC: NORMAL
PH, POC: 5.5
PROTEIN, POC: NORMAL
SPECIFIC GRAVITY, POC: >=1.03
UROBILINOGEN, POC: 0.2

## 2024-01-03 PROCEDURE — G8484 FLU IMMUNIZE NO ADMIN: HCPCS | Performed by: STUDENT IN AN ORGANIZED HEALTH CARE EDUCATION/TRAINING PROGRAM

## 2024-01-03 PROCEDURE — 81002 URINALYSIS NONAUTO W/O SCOPE: CPT | Performed by: STUDENT IN AN ORGANIZED HEALTH CARE EDUCATION/TRAINING PROGRAM

## 2024-01-03 PROCEDURE — 3017F COLORECTAL CA SCREEN DOC REV: CPT | Performed by: STUDENT IN AN ORGANIZED HEALTH CARE EDUCATION/TRAINING PROGRAM

## 2024-01-03 PROCEDURE — 1036F TOBACCO NON-USER: CPT | Performed by: STUDENT IN AN ORGANIZED HEALTH CARE EDUCATION/TRAINING PROGRAM

## 2024-01-03 PROCEDURE — 1123F ACP DISCUSS/DSCN MKR DOCD: CPT | Performed by: STUDENT IN AN ORGANIZED HEALTH CARE EDUCATION/TRAINING PROGRAM

## 2024-01-03 PROCEDURE — G8419 CALC BMI OUT NRM PARAM NOF/U: HCPCS | Performed by: STUDENT IN AN ORGANIZED HEALTH CARE EDUCATION/TRAINING PROGRAM

## 2024-01-03 PROCEDURE — 99215 OFFICE O/P EST HI 40 MIN: CPT | Performed by: STUDENT IN AN ORGANIZED HEALTH CARE EDUCATION/TRAINING PROGRAM

## 2024-01-03 PROCEDURE — G8427 DOCREV CUR MEDS BY ELIG CLIN: HCPCS | Performed by: STUDENT IN AN ORGANIZED HEALTH CARE EDUCATION/TRAINING PROGRAM

## 2024-01-03 RX ORDER — TAMSULOSIN HYDROCHLORIDE 0.4 MG/1
0.4 CAPSULE ORAL DAILY
Qty: 30 CAPSULE | Refills: 5 | Status: SHIPPED | OUTPATIENT
Start: 2024-01-03

## 2024-01-03 RX ORDER — TAMSULOSIN HYDROCHLORIDE 0.4 MG/1
0.4 CAPSULE ORAL DAILY
Qty: 30 CAPSULE | Refills: 5 | Status: SHIPPED | OUTPATIENT
Start: 2024-01-03 | End: 2024-01-03

## 2024-01-03 ASSESSMENT — ENCOUNTER SYMPTOMS
SHORTNESS OF BREATH: 0
BLOOD IN STOOL: 0
ABDOMINAL PAIN: 0
WHEEZING: 0

## 2024-01-03 NOTE — PROGRESS NOTES
Patrick Dumont is a 68 y.o.male who presents with   Chief Complaint   Patient presents with    Urinary Frequency     Having some burning    .    HPI    Review of Systems     Past Medical History:   Diagnosis Date    COVID-19 virus infection 1/13/2021    Hip dysplasia        Past Surgical History:   Procedure Laterality Date    CATARACT REMOVAL Bilateral 2006    COLONOSCOPY  2004,2014    HIP SURGERY      JOINT REPLACEMENT Right 2009    hip replacement    RETINAL DETACHMENT SURGERY Bilateral     x2       Social History     Socioeconomic History    Marital status:      Spouse name: Not on file    Number of children: 2    Years of education: Not on file    Highest education level: Not on file   Occupational History    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 [employer]   Tobacco Use    Smoking status: Never    Smokeless tobacco: Never   Vaping Use    Vaping Use: Never used   Substance and Sexual Activity    Alcohol use: Yes     Alcohol/week: 2.0 standard drinks of alcohol     Types: 1 Glasses of wine, 10 Cans of beer per week    Drug use: No    Sexual activity: Yes     Partners: Female   Other Topics Concern    Not on file   Social History Narrative    Retired--does patient play     remarried and doing well--kids close(has grandchildren)    Exercise daily and golfs     Social Determinants of Health     Financial Resource Strain: Low Risk  (9/25/2023)    Overall Financial Resource Strain (CARDIA)     Difficulty of Paying Living Expenses: Not hard at all   Food Insecurity: Not on file (9/25/2023)   Transportation Needs: Unknown (9/25/2023)    PRAPARE - Transportation     Lack of Transportation (Medical): Not on file     Lack of Transportation (Non-Medical): No   Physical Activity: Sufficiently Active (9/25/2023)    Exercise Vital Sign     Days of Exercise per Week: 6 days     Minutes of Exercise per Session: 110 min   Stress: Not on file   Social Connections: Not on file   Intimate Partner Violence: Not on file   Housing

## 2024-01-03 NOTE — PROGRESS NOTES
Patrick Dumont is a 68 y.o.male who presents with   Chief Complaint   Patient presents with    Urinary Frequency     Having some burning    .    Portions of this chart may have been created with voice recognition software. Occasional wrong-word or \"sound-alike\" substitutions may have occurred due to the inherent limitations of voice recognition software. Read the chart carefully and recognize, using context, where these substitutions have occurred.    Patient presents for urinary symptoms.  States that he is burning with urination additionally reports frequency.    Patient reports that he is a standardized patient for medical students and a student told them that there was an abnormality that she palpated on his thyroid.        Review of Systems   Constitutional:  Negative for fatigue and unexpected weight change.   HENT:  Negative for hearing loss.    Eyes:  Negative for visual disturbance.   Respiratory:  Negative for shortness of breath and wheezing.    Cardiovascular:  Negative for chest pain, palpitations and leg swelling.   Gastrointestinal:  Negative for abdominal pain and blood in stool.   Genitourinary:  Positive for dysuria and frequency. Negative for difficulty urinating and penile discharge.   Neurological:  Negative for dizziness and numbness.   Psychiatric/Behavioral:  Negative for hallucinations and suicidal ideas.         Past Medical History:   Diagnosis Date    COVID-19 virus infection 1/13/2021    Hip dysplasia        Past Surgical History:   Procedure Laterality Date    CATARACT REMOVAL Bilateral 2006    COLONOSCOPY  2004,2014    HIP SURGERY      JOINT REPLACEMENT Right 2009    hip replacement    RETINAL DETACHMENT SURGERY Bilateral     x2       Social History     Socioeconomic History    Marital status:      Spouse name: Not on file    Number of children: 2    Years of education: Not on file    Highest education level: Not on file   Occupational History    Occupation: sales--auto

## 2024-01-04 ENCOUNTER — TELEPHONE (OUTPATIENT)
Dept: FAMILY MEDICINE CLINIC | Age: 69
End: 2024-01-04

## 2024-01-04 DIAGNOSIS — R97.20 ABNORMAL PSA: Primary | ICD-10-CM

## 2024-01-04 DIAGNOSIS — R35.0 URINARY FREQUENCY: Primary | ICD-10-CM

## 2024-01-04 LAB
PSA SERPL DL<=0.01 NG/ML-MCNC: 11.06 NG/ML (ref 0–4)
TSH SERPL DL<=0.005 MIU/L-ACNC: 3.83 UIU/ML (ref 0.27–4.2)

## 2024-01-04 NOTE — TELEPHONE ENCOUNTER
Pt was referred to Urology, but the doctor he was referred to has since retired. Please update referred for anyone currently in that practice and call pt back.

## 2024-01-04 NOTE — TELEPHONE ENCOUNTER
I spoke with Patrick and gave him the information for Dr. Rm with [facility] and let him know that is who Dr. James likes to refer his patients to (he asked for Dr. James's recommendation). I also put a new referral in patients chart.

## 2024-01-17 ENCOUNTER — OFFICE VISIT (OUTPATIENT)
Dept: FAMILY MEDICINE CLINIC | Age: 69
End: 2024-01-17
Payer: MEDICARE

## 2024-01-17 VITALS
BODY MASS INDEX: 27.52 KG/M2 | HEIGHT: 70 IN | DIASTOLIC BLOOD PRESSURE: 80 MMHG | WEIGHT: 192.2 LBS | OXYGEN SATURATION: 97 % | SYSTOLIC BLOOD PRESSURE: 120 MMHG | HEART RATE: 69 BPM

## 2024-01-17 DIAGNOSIS — R97.20 PSA ELEVATION: ICD-10-CM

## 2024-01-17 DIAGNOSIS — R03.0 ELEVATED BLOOD PRESSURE READING: ICD-10-CM

## 2024-01-17 PROCEDURE — G8419 CALC BMI OUT NRM PARAM NOF/U: HCPCS | Performed by: FAMILY MEDICINE

## 2024-01-17 PROCEDURE — 99213 OFFICE O/P EST LOW 20 MIN: CPT | Performed by: FAMILY MEDICINE

## 2024-01-17 PROCEDURE — 3017F COLORECTAL CA SCREEN DOC REV: CPT | Performed by: FAMILY MEDICINE

## 2024-01-17 PROCEDURE — G8484 FLU IMMUNIZE NO ADMIN: HCPCS | Performed by: FAMILY MEDICINE

## 2024-01-17 PROCEDURE — 1036F TOBACCO NON-USER: CPT | Performed by: FAMILY MEDICINE

## 2024-01-17 PROCEDURE — 1123F ACP DISCUSS/DSCN MKR DOCD: CPT | Performed by: FAMILY MEDICINE

## 2024-01-17 PROCEDURE — G8427 DOCREV CUR MEDS BY ELIG CLIN: HCPCS | Performed by: FAMILY MEDICINE

## 2024-01-17 RX ORDER — KETOCONAZOLE 20 MG/G
CREAM TOPICAL DAILY
COMMUNITY

## 2024-01-17 RX ORDER — FLUOCINONIDE GEL 0.5 MG/G
GEL TOPICAL 2 TIMES DAILY
COMMUNITY

## 2024-01-17 ASSESSMENT — ENCOUNTER SYMPTOMS
SHORTNESS OF BREATH: 0
BLURRED VISION: 0
ORTHOPNEA: 0

## 2024-01-17 ASSESSMENT — PATIENT HEALTH QUESTIONNAIRE - PHQ9
SUM OF ALL RESPONSES TO PHQ QUESTIONS 1-9: 0
SUM OF ALL RESPONSES TO PHQ QUESTIONS 1-9: 0
2. FEELING DOWN, DEPRESSED OR HOPELESS: 0
SUM OF ALL RESPONSES TO PHQ9 QUESTIONS 1 & 2: 0
1. LITTLE INTEREST OR PLEASURE IN DOING THINGS: 0
SUM OF ALL RESPONSES TO PHQ QUESTIONS 1-9: 0
SUM OF ALL RESPONSES TO PHQ QUESTIONS 1-9: 0

## 2024-01-17 NOTE — PROGRESS NOTES
Subjective:     Patient ID:Patrick Dumont is a 68 y.o. male.    Hypertension  This is a recurrent problem. The current episode started more than 1 year ago. The problem is unchanged. The problem is uncontrolled. Pertinent negatives include no anxiety, blurred vision, chest pain, headaches, malaise/fatigue, neck pain, orthopnea, palpitations, peripheral edema, PND, shortness of breath or sweats. There are no associated agents to hypertension. There are no known risk factors for coronary artery disease. Past treatments include lifestyle changes. The current treatment provides moderate improvement. There are no compliance problems.  There is no history of angina, kidney disease, CAD/MI, CVA, heart failure, left ventricular hypertrophy, PVD or retinopathy. There is no history of chronic renal disease, coarctation of the aorta, hyperaldosteronism, hypercortisolism, hyperparathyroidism, a hypertension causing med, pheochromocytoma, renovascular disease, sleep apnea or a thyroid problem.       No Known Allergies    Current Outpatient Medications   Medication Sig Dispense Refill    ketoconazole (NIZORAL) 2 % cream Apply topically daily Apply topically daily.      fluocinonide (LIDEX) 0.05 % gel Apply topically 2 times daily Apply topically 2 times daily.      tamsulosin (FLOMAX) 0.4 MG capsule Take 1 capsule by mouth daily 30 capsule 5    TURMERIC CURCUMIN PO Take by mouth      KRILL OIL PO Take by mouth      Ascorbic Acid (VITAMIN C) 500 MG tablet Take 2 tablets by mouth daily      multivitamin (THERAGRAN) per tablet Take 1 tablet by mouth daily      vitamin E 1000 UNITS capsule Take 1 capsule by mouth daily      Cholecalciferol (VITAMIN D-3) 5000 UNITS TABS Take  by mouth.       No current facility-administered medications for this visit.       Past Medical History:   Diagnosis Date    COVID-19 virus infection 1/13/2021    Hip dysplasia        Past Surgical History:   Procedure Laterality Date    CATARACT REMOVAL

## 2024-02-29 ENCOUNTER — OFFICE VISIT (OUTPATIENT)
Dept: FAMILY MEDICINE CLINIC | Age: 69
End: 2024-02-29
Payer: MEDICARE

## 2024-02-29 VITALS
SYSTOLIC BLOOD PRESSURE: 138 MMHG | HEIGHT: 70 IN | WEIGHT: 191.4 LBS | DIASTOLIC BLOOD PRESSURE: 80 MMHG | BODY MASS INDEX: 27.4 KG/M2 | HEART RATE: 67 BPM | OXYGEN SATURATION: 98 %

## 2024-02-29 DIAGNOSIS — Z96.641 STATUS POST RIGHT HIP REPLACEMENT: ICD-10-CM

## 2024-02-29 DIAGNOSIS — Z01.818 PREOP EXAMINATION: Primary | ICD-10-CM

## 2024-02-29 DIAGNOSIS — Z01.818 PRE-OP EXAM: ICD-10-CM

## 2024-02-29 DIAGNOSIS — R03.0 ELEVATED BLOOD PRESSURE READING: ICD-10-CM

## 2024-02-29 DIAGNOSIS — R97.20 PSA ELEVATION: ICD-10-CM

## 2024-02-29 PROCEDURE — 99214 OFFICE O/P EST MOD 30 MIN: CPT | Performed by: FAMILY MEDICINE

## 2024-02-29 PROCEDURE — 1036F TOBACCO NON-USER: CPT | Performed by: FAMILY MEDICINE

## 2024-02-29 PROCEDURE — 1123F ACP DISCUSS/DSCN MKR DOCD: CPT | Performed by: FAMILY MEDICINE

## 2024-02-29 PROCEDURE — G8427 DOCREV CUR MEDS BY ELIG CLIN: HCPCS | Performed by: FAMILY MEDICINE

## 2024-02-29 PROCEDURE — 93000 ELECTROCARDIOGRAM COMPLETE: CPT | Performed by: FAMILY MEDICINE

## 2024-02-29 PROCEDURE — 3017F COLORECTAL CA SCREEN DOC REV: CPT | Performed by: FAMILY MEDICINE

## 2024-02-29 PROCEDURE — G8419 CALC BMI OUT NRM PARAM NOF/U: HCPCS | Performed by: FAMILY MEDICINE

## 2024-02-29 PROCEDURE — G8484 FLU IMMUNIZE NO ADMIN: HCPCS | Performed by: FAMILY MEDICINE

## 2024-02-29 ASSESSMENT — PATIENT HEALTH QUESTIONNAIRE - PHQ9
SUM OF ALL RESPONSES TO PHQ QUESTIONS 1-9: 0
SUM OF ALL RESPONSES TO PHQ QUESTIONS 1-9: 0
1. LITTLE INTEREST OR PLEASURE IN DOING THINGS: 0
SUM OF ALL RESPONSES TO PHQ QUESTIONS 1-9: 0
SUM OF ALL RESPONSES TO PHQ QUESTIONS 1-9: 0
SUM OF ALL RESPONSES TO PHQ9 QUESTIONS 1 & 2: 0
2. FEELING DOWN, DEPRESSED OR HOPELESS: 0

## 2024-02-29 NOTE — ASSESSMENT & PLAN NOTE
Uncontrolled, continue current medications and progressive ^ in PSA--MRI showed intermediate risk and will get bx next--good candidate for this

## 2024-02-29 NOTE — PROGRESS NOTES
Subjective:      Patrick Dumont is a 68 y.o. male who presents to the office today for a preoperative consultation at the request of surgeon Dr Rm who plans on performing prostate bx(^PSA) on March 20.  This consultation is requested for the specific conditions prompting preoperative evaluation (i.e. because of potential affect on operative risk): borderline BP.  Planned anesthesia is General.  The patient and family have no known anesthesia issues nor bleeding risks.   Past Medical History:   Diagnosis Date    COVID-19 virus infection 1/13/2021    Hip dysplasia      Patient Active Problem List    Diagnosis Date Noted    Status post right hip replacement 02/29/2024    PSA elevation 01/17/2024    Dupuytrens contracture 09/25/2023    Back pain of lumbar region with sciatica 12/06/2021    COVID-19 virus infection 01/13/2021    Arthritis of knee 01/13/2021    Arthralgia of both hands 01/15/2018    Acquired ptosis of both eyelids 05/25/2017    Pre-op exam 05/25/2017    Elevated blood pressure reading 05/11/2016    Neck pain on left side 11/12/2014     Past Surgical History:   Procedure Laterality Date    CATARACT REMOVAL Bilateral 2006    COLONOSCOPY  2004,2014    HIP SURGERY      JOINT REPLACEMENT Right 2009    hip replacement    RETINAL DETACHMENT SURGERY Bilateral     x2     Family History   Problem Relation Age of Onset    Other Mother         SLE    Lupus Mother     Dementia Mother     Heart Disease Father     High Blood Pressure Brother     Stroke Brother     Other Brother         pheochromocytoma     Social History     Socioeconomic History    Marital status:      Spouse name: None    Number of children: 2    Years of education: None    Highest education level: None   Occupational History    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 [employer]   Tobacco Use    Smoking status: Never    Smokeless tobacco: Never   Vaping Use    Vaping Use: Never used   Substance and Sexual Activity    Alcohol use: Yes

## 2024-03-08 LAB
BUN SERPL-MCNC: 25 MG/DL (ref 7–20)
CREAT SERPL-MCNC: 1.1 MG/DL (ref 0.8–1.3)
GFR SERPLBLD CREATININE-BSD FMLA CKD-EPI: >60 ML/MIN/{1.73_M2}

## 2024-03-30 PROBLEM — Z01.818 PRE-OP EXAM: Status: RESOLVED | Noted: 2017-05-25 | Resolved: 2024-03-30

## 2024-04-09 ENCOUNTER — OFFICE VISIT (OUTPATIENT)
Dept: FAMILY MEDICINE CLINIC | Age: 69
End: 2024-04-09
Payer: MEDICARE

## 2024-04-09 VITALS
DIASTOLIC BLOOD PRESSURE: 80 MMHG | BODY MASS INDEX: 27.03 KG/M2 | HEIGHT: 70 IN | SYSTOLIC BLOOD PRESSURE: 124 MMHG | WEIGHT: 188.8 LBS | HEART RATE: 68 BPM | OXYGEN SATURATION: 98 %

## 2024-04-09 DIAGNOSIS — R03.0 ELEVATED BLOOD PRESSURE READING: ICD-10-CM

## 2024-04-09 DIAGNOSIS — N39.0 COMPLICATED UTI (URINARY TRACT INFECTION): ICD-10-CM

## 2024-04-09 DIAGNOSIS — Z09 HOSPITAL DISCHARGE FOLLOW-UP: ICD-10-CM

## 2024-04-09 DIAGNOSIS — A41.9 SEPSIS, DUE TO UNSPECIFIED ORGANISM, UNSPECIFIED WHETHER ACUTE ORGAN DYSFUNCTION PRESENT (HCC): ICD-10-CM

## 2024-04-09 DIAGNOSIS — C61 PROSTATE CA (HCC): Primary | ICD-10-CM

## 2024-04-09 DIAGNOSIS — D69.6 THROMBOCYTOPENIA, UNSPECIFIED (HCC): ICD-10-CM

## 2024-04-09 DIAGNOSIS — R35.0 URINARY FREQUENCY: ICD-10-CM

## 2024-04-09 LAB
BILIRUB UR QL STRIP.AUTO: NEGATIVE
CLARITY UR: CLEAR
COLOR UR: YELLOW
GLUCOSE UR STRIP.AUTO-MCNC: NEGATIVE MG/DL
HGB UR QL STRIP.AUTO: NEGATIVE
KETONES UR STRIP.AUTO-MCNC: ABNORMAL MG/DL
LEUKOCYTE ESTERASE UR QL STRIP.AUTO: NEGATIVE
NITRITE UR QL STRIP.AUTO: NEGATIVE
PH UR STRIP.AUTO: 5.5 [PH] (ref 5–8)
PROT UR STRIP.AUTO-MCNC: NEGATIVE MG/DL
SP GR UR STRIP.AUTO: 1.03 (ref 1–1.03)
UA COMPLETE W REFLEX CULTURE PNL UR: ABNORMAL
UA DIPSTICK W REFLEX MICRO PNL UR: ABNORMAL
URN SPEC COLLECT METH UR: ABNORMAL
UROBILINOGEN UR STRIP-ACNC: 0.2 E.U./DL

## 2024-04-09 PROCEDURE — G8419 CALC BMI OUT NRM PARAM NOF/U: HCPCS | Performed by: FAMILY MEDICINE

## 2024-04-09 PROCEDURE — 1036F TOBACCO NON-USER: CPT | Performed by: FAMILY MEDICINE

## 2024-04-09 PROCEDURE — 99214 OFFICE O/P EST MOD 30 MIN: CPT | Performed by: FAMILY MEDICINE

## 2024-04-09 PROCEDURE — 1123F ACP DISCUSS/DSCN MKR DOCD: CPT | Performed by: FAMILY MEDICINE

## 2024-04-09 PROCEDURE — 1111F DSCHRG MED/CURRENT MED MERGE: CPT | Performed by: FAMILY MEDICINE

## 2024-04-09 PROCEDURE — G8427 DOCREV CUR MEDS BY ELIG CLIN: HCPCS | Performed by: FAMILY MEDICINE

## 2024-04-09 PROCEDURE — 3017F COLORECTAL CA SCREEN DOC REV: CPT | Performed by: FAMILY MEDICINE

## 2024-04-09 ASSESSMENT — ENCOUNTER SYMPTOMS
ABDOMINAL PAIN: 0
EYE ITCHING: 0
EYE DISCHARGE: 0
BLOOD IN STOOL: 0
COLOR CHANGE: 0
RHINORRHEA: 0
ANAL BLEEDING: 0
ABDOMINAL DISTENTION: 0
FACIAL SWELLING: 0
BACK PAIN: 0
PHOTOPHOBIA: 0
EYE PAIN: 0
STRIDOR: 0
APNEA: 0
CHEST TIGHTNESS: 0
SINUS PRESSURE: 0
COUGH: 0
CHOKING: 0
EYE REDNESS: 0
SHORTNESS OF BREATH: 0
WHEEZING: 0

## 2024-04-09 ASSESSMENT — PATIENT HEALTH QUESTIONNAIRE - PHQ9
SUM OF ALL RESPONSES TO PHQ QUESTIONS 1-9: 0
SUM OF ALL RESPONSES TO PHQ QUESTIONS 1-9: 0
SUM OF ALL RESPONSES TO PHQ9 QUESTIONS 1 & 2: 0
2. FEELING DOWN, DEPRESSED OR HOPELESS: NOT AT ALL
SUM OF ALL RESPONSES TO PHQ QUESTIONS 1-9: 0
SUM OF ALL RESPONSES TO PHQ QUESTIONS 1-9: 0
1. LITTLE INTEREST OR PLEASURE IN DOING THINGS: NOT AT ALL

## 2024-04-09 NOTE — PROGRESS NOTES
Bowel sounds are normal. There is no distension.      Palpations: Abdomen is soft. There is no mass.      Tenderness: There is no abdominal tenderness. There is no right CVA tenderness, left CVA tenderness, guarding or rebound.      Hernia: No hernia is present.   Musculoskeletal:         General: No swelling, tenderness, deformity or signs of injury.      Cervical back: Normal range of motion. No rigidity or tenderness.      Right lower leg: No edema.      Left lower leg: No edema.   Lymphadenopathy:      Cervical: No cervical adenopathy.   Skin:     General: Skin is warm and dry.      Coloration: Skin is not pale.      Findings: No erythema or rash.   Neurological:      Mental Status: He is alert and oriented to person, place, and time.      Cranial Nerves: No cranial nerve deficit.      Motor: No weakness or abnormal muscle tone.      Coordination: Coordination normal.      Gait: Gait normal.      Deep Tendon Reflexes: Reflexes normal.   Psychiatric:         Mood and Affect: Mood normal.         Behavior: Behavior normal.         Thought Content: Thought content normal.         Judgment: Judgment normal.           An electronic signature was used to authenticate this note.  --Curt James MD

## 2024-07-10 DIAGNOSIS — R35.0 URINARY FREQUENCY: ICD-10-CM

## 2024-07-10 DIAGNOSIS — R03.0 ELEVATED BLOOD PRESSURE READING: ICD-10-CM

## 2024-07-10 RX ORDER — TAMSULOSIN HYDROCHLORIDE 0.4 MG/1
0.4 CAPSULE ORAL DAILY
Qty: 30 CAPSULE | Refills: 5 | Status: SHIPPED | OUTPATIENT
Start: 2024-07-10

## 2024-10-07 ENCOUNTER — OFFICE VISIT (OUTPATIENT)
Dept: FAMILY MEDICINE CLINIC | Age: 69
End: 2024-10-07
Payer: MEDICARE

## 2024-10-07 VITALS
HEART RATE: 64 BPM | DIASTOLIC BLOOD PRESSURE: 80 MMHG | BODY MASS INDEX: 27.11 KG/M2 | WEIGHT: 189.4 LBS | HEIGHT: 70 IN | SYSTOLIC BLOOD PRESSURE: 130 MMHG | OXYGEN SATURATION: 96 %

## 2024-10-07 DIAGNOSIS — C61 PROSTATE CA (HCC): ICD-10-CM

## 2024-10-07 DIAGNOSIS — Z96.641 STATUS POST RIGHT HIP REPLACEMENT: ICD-10-CM

## 2024-10-07 DIAGNOSIS — Z00.00 INITIAL MEDICARE ANNUAL WELLNESS VISIT: Primary | ICD-10-CM

## 2024-10-07 DIAGNOSIS — E78.2 MIXED HYPERLIPIDEMIA: ICD-10-CM

## 2024-10-07 DIAGNOSIS — Z00.00 WELL ADULT EXAM: ICD-10-CM

## 2024-10-07 DIAGNOSIS — D69.6 THROMBOCYTOPENIA, UNSPECIFIED (HCC): ICD-10-CM

## 2024-10-07 PROBLEM — A41.9 SEPSIS (HCC): Status: RESOLVED | Noted: 2024-04-09 | Resolved: 2024-10-07

## 2024-10-07 LAB
ALBUMIN SERPL-MCNC: 4.2 G/DL (ref 3.4–5)
ALBUMIN/GLOB SERPL: 1.4 {RATIO} (ref 1.1–2.2)
ALP SERPL-CCNC: 87 U/L (ref 40–129)
ALT SERPL-CCNC: 30 U/L (ref 10–40)
ANION GAP SERPL CALCULATED.3IONS-SCNC: 8 MMOL/L (ref 3–16)
AST SERPL-CCNC: 34 U/L (ref 15–37)
BASOPHILS # BLD: 0 K/UL (ref 0–0.2)
BASOPHILS NFR BLD: 0.6 %
BILIRUB SERPL-MCNC: 0.4 MG/DL (ref 0–1)
BUN SERPL-MCNC: 25 MG/DL (ref 7–20)
CALCIUM SERPL-MCNC: 9.3 MG/DL (ref 8.3–10.6)
CHLORIDE SERPL-SCNC: 105 MMOL/L (ref 99–110)
CHOLEST SERPL-MCNC: 198 MG/DL (ref 0–199)
CO2 SERPL-SCNC: 27 MMOL/L (ref 21–32)
CREAT SERPL-MCNC: 1 MG/DL (ref 0.8–1.3)
DEPRECATED RDW RBC AUTO: 14 % (ref 12.4–15.4)
EOSINOPHIL # BLD: 0.1 K/UL (ref 0–0.6)
EOSINOPHIL NFR BLD: 1.8 %
GFR SERPLBLD CREATININE-BSD FMLA CKD-EPI: 81 ML/MIN/{1.73_M2}
GLUCOSE SERPL-MCNC: 97 MG/DL (ref 70–99)
HCT VFR BLD AUTO: 39.8 % (ref 40.5–52.5)
HDLC SERPL-MCNC: 63 MG/DL (ref 40–60)
HGB BLD-MCNC: 13.9 G/DL (ref 13.5–17.5)
LDLC SERPL CALC-MCNC: 125 MG/DL
LYMPHOCYTES # BLD: 1.4 K/UL (ref 1–5.1)
LYMPHOCYTES NFR BLD: 28.7 %
MCH RBC QN AUTO: 31.3 PG (ref 26–34)
MCHC RBC AUTO-ENTMCNC: 35 G/DL (ref 31–36)
MCV RBC AUTO: 89.6 FL (ref 80–100)
MONOCYTES # BLD: 0.5 K/UL (ref 0–1.3)
MONOCYTES NFR BLD: 10 %
NEUTROPHILS # BLD: 2.8 K/UL (ref 1.7–7.7)
NEUTROPHILS NFR BLD: 58.9 %
PLATELET # BLD AUTO: 185 K/UL (ref 135–450)
PMV BLD AUTO: 8.4 FL (ref 5–10.5)
POTASSIUM SERPL-SCNC: 4.6 MMOL/L (ref 3.5–5.1)
PROT SERPL-MCNC: 7.2 G/DL (ref 6.4–8.2)
PSA SERPL DL<=0.01 NG/ML-MCNC: 5.37 NG/ML (ref 0–4)
RBC # BLD AUTO: 4.45 M/UL (ref 4.2–5.9)
SODIUM SERPL-SCNC: 140 MMOL/L (ref 136–145)
TRIGL SERPL-MCNC: 48 MG/DL (ref 0–150)
VLDLC SERPL CALC-MCNC: 10 MG/DL
WBC # BLD AUTO: 4.8 K/UL (ref 4–11)

## 2024-10-07 PROCEDURE — 3017F COLORECTAL CA SCREEN DOC REV: CPT | Performed by: FAMILY MEDICINE

## 2024-10-07 PROCEDURE — 1123F ACP DISCUSS/DSCN MKR DOCD: CPT | Performed by: FAMILY MEDICINE

## 2024-10-07 PROCEDURE — G0008 ADMIN INFLUENZA VIRUS VAC: HCPCS | Performed by: FAMILY MEDICINE

## 2024-10-07 PROCEDURE — 90653 IIV ADJUVANT VACCINE IM: CPT | Performed by: FAMILY MEDICINE

## 2024-10-07 PROCEDURE — G8482 FLU IMMUNIZE ORDER/ADMIN: HCPCS | Performed by: FAMILY MEDICINE

## 2024-10-07 PROCEDURE — G0438 PPPS, INITIAL VISIT: HCPCS | Performed by: FAMILY MEDICINE

## 2024-10-07 RX ORDER — AMOXICILLIN 500 MG/1
CAPSULE ORAL
Qty: 20 CAPSULE | Refills: 0 | Status: SHIPPED | OUTPATIENT
Start: 2024-10-07

## 2024-10-07 RX ORDER — TADALAFIL 5 MG/1
5 TABLET ORAL PRN
COMMUNITY

## 2024-10-07 SDOH — ECONOMIC STABILITY: FOOD INSECURITY: WITHIN THE PAST 12 MONTHS, YOU WORRIED THAT YOUR FOOD WOULD RUN OUT BEFORE YOU GOT MONEY TO BUY MORE.: NEVER TRUE

## 2024-10-07 SDOH — ECONOMIC STABILITY: INCOME INSECURITY: HOW HARD IS IT FOR YOU TO PAY FOR THE VERY BASICS LIKE FOOD, HOUSING, MEDICAL CARE, AND HEATING?: NOT HARD AT ALL

## 2024-10-07 SDOH — ECONOMIC STABILITY: FOOD INSECURITY: WITHIN THE PAST 12 MONTHS, THE FOOD YOU BOUGHT JUST DIDN'T LAST AND YOU DIDN'T HAVE MONEY TO GET MORE.: NEVER TRUE

## 2024-10-07 ASSESSMENT — LIFESTYLE VARIABLES
HOW MANY STANDARD DRINKS CONTAINING ALCOHOL DO YOU HAVE ON A TYPICAL DAY: 1 OR 2
HOW OFTEN DO YOU HAVE A DRINK CONTAINING ALCOHOL: 2-3 TIMES A WEEK

## 2024-10-07 ASSESSMENT — PATIENT HEALTH QUESTIONNAIRE - PHQ9
SUM OF ALL RESPONSES TO PHQ QUESTIONS 1-9: 0
SUM OF ALL RESPONSES TO PHQ9 QUESTIONS 1 & 2: 0
1. LITTLE INTEREST OR PLEASURE IN DOING THINGS: NOT AT ALL
SUM OF ALL RESPONSES TO PHQ QUESTIONS 1-9: 0
SUM OF ALL RESPONSES TO PHQ QUESTIONS 1-9: 0
2. FEELING DOWN, DEPRESSED OR HOPELESS: NOT AT ALL
SUM OF ALL RESPONSES TO PHQ QUESTIONS 1-9: 0

## 2024-10-07 NOTE — PROGRESS NOTES
Medicare Annual Wellness Visit    Patrick Dumont is here for Medicare AW    Assessment & Plan   Well adult exam  Prostate CA (HCC)  Assessment & Plan:    Tx with radiotx  Orders:  -     Comprehensive Metabolic Panel; Future  -     CBC with Auto Differential; Future  Thrombocytopenia, unspecified (HCC)  Assessment & Plan:   -recheck  Orders:  -     CBC with Auto Differential; Future  Status post right hip replacement  Assessment & Plan:   Chronic, worsening (exacerbation), continue current treatment plan-antibiotic prophylaxis  Mixed hyperlipidemia  -     Comprehensive Metabolic Panel; Future  -     Lipid Panel; Future    Recommendations for Preventive Services Due: see orders and patient instructions/AVS.  Recommended screening schedule for the next 5-10 years is provided to the patient in written form: see Patient Instructions/AVS.     No follow-ups on file.     Subjective   The following acute and/or chronic problems were also addressed today:  Prostate CA (HCC)    Tx with radiotx    Thrombocytopenia, unspecified (HCC)   -recheck    Status post right hip replacement   Chronic, worsening (exacerbation), continue current treatment plan-antibiotic prophylaxis      Patient's complete Health Risk Assessment and screening values have been reviewed and are found in Flowsheets. The following problems were reviewed today and where indicated follow up appointments were made and/or referrals ordered.    Positive Risk Factor Screenings with Interventions:    Fall Risk:  Do you feel unsteady or are you worried about falling? : no  2 or more falls in past year?: no  Fall with injury in past year?: (!) yes       Interventions:    Reviewed medications, home hazards, visual acuity, and co-morbidities that can increase risk for falls                   Safety:  Do you have non-slip mats or non-slip surfaces or shower bars or grab bars in your shower or bathtub?: (!) No    Interventions:  Patient declined any further interventions

## 2024-11-12 ENCOUNTER — APPOINTMENT (RX ONLY)
Dept: URBAN - METROPOLITAN AREA CLINIC 170 | Facility: CLINIC | Age: 69
Setting detail: DERMATOLOGY
End: 2024-11-12

## 2024-11-12 DIAGNOSIS — L82.1 OTHER SEBORRHEIC KERATOSIS: ICD-10-CM | Status: STABLE

## 2024-11-12 DIAGNOSIS — D22 MELANOCYTIC NEVI: ICD-10-CM | Status: STABLE

## 2024-11-12 DIAGNOSIS — L28.1 PRURIGO NODULARIS: ICD-10-CM | Status: STABLE

## 2024-11-12 DIAGNOSIS — L21.8 OTHER SEBORRHEIC DERMATITIS: ICD-10-CM | Status: STABLE

## 2024-11-12 DIAGNOSIS — L57.0 ACTINIC KERATOSIS: ICD-10-CM

## 2024-11-12 DIAGNOSIS — D18.0 HEMANGIOMA: ICD-10-CM | Status: STABLE

## 2024-11-12 DIAGNOSIS — L81.4 OTHER MELANIN HYPERPIGMENTATION: ICD-10-CM | Status: STABLE

## 2024-11-12 PROBLEM — D18.01 HEMANGIOMA OF SKIN AND SUBCUTANEOUS TISSUE: Status: ACTIVE | Noted: 2024-11-12

## 2024-11-12 PROBLEM — D22.5 MELANOCYTIC NEVI OF TRUNK: Status: ACTIVE | Noted: 2024-11-12

## 2024-11-12 PROCEDURE — ? PRESCRIPTION

## 2024-11-12 PROCEDURE — ? PRESCRIPTION MEDICATION MANAGEMENT

## 2024-11-12 PROCEDURE — ? LIQUID NITROGEN

## 2024-11-12 PROCEDURE — ? MDM - TREATMENT GOALS

## 2024-11-12 PROCEDURE — ? FULL BODY SKIN EXAM

## 2024-11-12 PROCEDURE — ? TREATMENT REGIMEN

## 2024-11-12 PROCEDURE — ? COUNSELING

## 2024-11-12 PROCEDURE — 17003 DESTRUCT PREMALG LES 2-14: CPT

## 2024-11-12 PROCEDURE — 99214 OFFICE O/P EST MOD 30 MIN: CPT | Mod: 25

## 2024-11-12 PROCEDURE — 17000 DESTRUCT PREMALG LESION: CPT

## 2024-11-12 RX ORDER — KETOCONAZOLE 20 MG/G
CREAM TOPICAL
Qty: 60 | Refills: 11 | Status: ERX

## 2024-11-12 RX ORDER — FLUOCINONIDE 0.5 MG/G
GEL TOPICAL
Qty: 60 | Refills: 1 | Status: ERX

## 2024-11-12 ASSESSMENT — LOCATION ZONE DERM
LOCATION ZONE: FACE
LOCATION ZONE: NECK
LOCATION ZONE: LEG
LOCATION ZONE: TRUNK
LOCATION ZONE: ARM

## 2024-11-12 ASSESSMENT — LOCATION DETAILED DESCRIPTION DERM
LOCATION DETAILED: LEFT KNEE
LOCATION DETAILED: LEFT SUPERIOR MEDIAL FOREHEAD
LOCATION DETAILED: LEFT ANTERIOR PROXIMAL UPPER ARM
LOCATION DETAILED: RIGHT MID-UPPER BACK
LOCATION DETAILED: RIGHT ANTERIOR DISTAL THIGH
LOCATION DETAILED: LEFT SUPERIOR LATERAL BUCCAL CHEEK
LOCATION DETAILED: LEFT CENTRAL EYEBROW
LOCATION DETAILED: LEFT SUPERIOR MEDIAL MIDBACK
LOCATION DETAILED: LEFT ANTERIOR DISTAL THIGH
LOCATION DETAILED: PERIUMBILICAL SKIN
LOCATION DETAILED: MID POSTERIOR NECK
LOCATION DETAILED: RIGHT CENTRAL EYEBROW
LOCATION DETAILED: RIGHT ANTERIOR DISTAL UPPER ARM
LOCATION DETAILED: EPIGASTRIC SKIN
LOCATION DETAILED: INFERIOR MID FOREHEAD

## 2024-11-12 ASSESSMENT — LOCATION SIMPLE DESCRIPTION DERM
LOCATION SIMPLE: RIGHT THIGH
LOCATION SIMPLE: RIGHT EYEBROW
LOCATION SIMPLE: ABDOMEN
LOCATION SIMPLE: LEFT THIGH
LOCATION SIMPLE: RIGHT UPPER ARM
LOCATION SIMPLE: LEFT FOREHEAD
LOCATION SIMPLE: LEFT CHEEK
LOCATION SIMPLE: LEFT LOWER BACK
LOCATION SIMPLE: INFERIOR FOREHEAD
LOCATION SIMPLE: LEFT UPPER ARM
LOCATION SIMPLE: LEFT EYEBROW
LOCATION SIMPLE: POSTERIOR NECK
LOCATION SIMPLE: LEFT KNEE
LOCATION SIMPLE: RIGHT UPPER BACK

## 2024-11-12 NOTE — PROCEDURE: LIQUID NITROGEN
Consent: The patient's consent was obtained including but not limited to risks of crusting, scabbing, blistering, scarring, darker or lighter pigmentary change, recurrence, incomplete removal and infection.
Show Aperture Variable?: Yes
Number Of Freeze-Thaw Cycles: 1 freeze-thaw cycle
Render Note In Bullet Format When Appropriate: No
Detail Level: Detailed
Duration Of Freeze Thaw-Cycle (Seconds): 5
Post-Care Instructions: I reviewed with the patient in detail post-care instructions. Patient is to wear sunprotection, and avoid picking at any of the treated lesions. Pt may apply Vaseline to crusted or scabbing areas.

## 2024-11-12 NOTE — PROCEDURE: COUNSELING
Detail Level: Generalized
Topical Steroid Recommendations: Ok to use OTC HCT 1-2x daily x 1 week PRN flares
Detail Level: Zone

## 2024-11-12 NOTE — PROCEDURE: PRESCRIPTION MEDICATION MANAGEMENT
Continue Regimen: Ketoconozole shampoo 1-2 x a day, Prn
Detail Level: Detailed
Render In Strict Bullet Format?: No
Continue Regimen: Fluocinonide gel 0.05% 1-2 x a day, Prn

## 2024-12-17 PROBLEM — K57.30 COLON, DIVERTICULOSIS: Status: ACTIVE | Noted: 2024-12-17

## 2024-12-17 PROBLEM — K63.5 COLON POLYP: Status: ACTIVE | Noted: 2024-12-17

## 2025-02-05 DIAGNOSIS — M72.0 DUPUYTRENS CONTRACTURE: Primary | ICD-10-CM

## 2025-02-10 SDOH — HEALTH STABILITY: PHYSICAL HEALTH: ON AVERAGE, HOW MANY MINUTES DO YOU ENGAGE IN EXERCISE AT THIS LEVEL?: 90 MIN

## 2025-02-10 SDOH — HEALTH STABILITY: PHYSICAL HEALTH: ON AVERAGE, HOW MANY DAYS PER WEEK DO YOU ENGAGE IN MODERATE TO STRENUOUS EXERCISE (LIKE A BRISK WALK)?: 5 DAYS

## 2025-02-11 ENCOUNTER — OFFICE VISIT (OUTPATIENT)
Dept: ORTHOPEDIC SURGERY | Age: 70
End: 2025-02-11
Payer: MEDICARE

## 2025-02-11 VITALS — BODY MASS INDEX: 27.06 KG/M2 | RESPIRATION RATE: 14 BRPM | WEIGHT: 189 LBS | HEIGHT: 70 IN

## 2025-02-11 DIAGNOSIS — G56.03 CARPAL TUNNEL SYNDROME, BILATERAL: ICD-10-CM

## 2025-02-11 DIAGNOSIS — M18.0 ARTHRITIS OF CARPOMETACARPAL (CMC) JOINTS OF BOTH THUMBS: Primary | ICD-10-CM

## 2025-02-11 DIAGNOSIS — M72.0 DUPUYTREN'S DISEASE OF PALM OF BOTH HANDS: ICD-10-CM

## 2025-02-11 PROCEDURE — 1159F MED LIST DOCD IN RCRD: CPT | Performed by: ORTHOPAEDIC SURGERY

## 2025-02-11 PROCEDURE — 1125F AMNT PAIN NOTED PAIN PRSNT: CPT | Performed by: ORTHOPAEDIC SURGERY

## 2025-02-11 PROCEDURE — 1036F TOBACCO NON-USER: CPT | Performed by: ORTHOPAEDIC SURGERY

## 2025-02-11 PROCEDURE — G8419 CALC BMI OUT NRM PARAM NOF/U: HCPCS | Performed by: ORTHOPAEDIC SURGERY

## 2025-02-11 PROCEDURE — G8427 DOCREV CUR MEDS BY ELIG CLIN: HCPCS | Performed by: ORTHOPAEDIC SURGERY

## 2025-02-11 PROCEDURE — 1123F ACP DISCUSS/DSCN MKR DOCD: CPT | Performed by: ORTHOPAEDIC SURGERY

## 2025-02-11 PROCEDURE — 3017F COLORECTAL CA SCREEN DOC REV: CPT | Performed by: ORTHOPAEDIC SURGERY

## 2025-02-11 PROCEDURE — 99204 OFFICE O/P NEW MOD 45 MIN: CPT | Performed by: ORTHOPAEDIC SURGERY

## 2025-02-11 NOTE — PROGRESS NOTES
This 69 y.o. right hand dominant retired man is seen in referral for Curt James MD  with a chief complaint of intermittent pain at the base of the bilateral thumbs, L>R.  This has been present for 1 year.  There is mild pain at rest.  Pain is aggrevated by movement, pinching and gripping.  Symptoms have fluctuated recently. There is no mechanical snapping or popping on movement. There is no history of injury or chronic overuse.  Arthritic symptoms are sometimes noticed in other joints(AC joint, knee, fingers)).  There is a family history of arthritis. Proprietary pain/ antiinflammatory medications, in small doses, partially relieve symptoms. He is also concerned about numbness and paresthesias of his fingers, worse at night as well as the recent appearance of lumps in the palms of both hands. There is a family history of diabetes and possibly carpal tunnel syndrome. The pain assessment has been reviewed and is correct as stated.    The patient's social history, past medical history, family history, medications, allergies and review of systems, entered 2/11/25,  have been reviewed, and dated and are recorded in the chart.    On physical examination the patient is Height: 177.8 cm (5' 10\"), Weight - Scale: 85.7 kg (189 lb),  Respirations: 14 per minute.  The patient is well nourished, is oriented to time and place, demonstrates appropriate mood and affect as well as normal gait and station.  There are deformities of the DIP joints of multiple fingers consistent with osteoarthritis.  There is soft tissue swelling and bony thickening over the basilar joint areas of the thumbs, without associated warmth or erythema.  There is tenderness to palpation over the base of the thumb metacarpals.  The grind test is positive.  On testing range of motion, there is no limitation of flexion, extension or retroposition.  There is no thickening or tenderness over the flexor tendon sheaths.  There is no tenderness over the

## 2025-04-15 LAB — PSA SERPL DL<=0.01 NG/ML-MCNC: 1.88 NG/ML (ref 0–4)

## 2025-07-24 ENCOUNTER — APPOINTMENT (OUTPATIENT)
Dept: URBAN - METROPOLITAN AREA CLINIC 170 | Facility: CLINIC | Age: 70
Setting detail: DERMATOLOGY
End: 2025-07-24

## 2025-07-24 DIAGNOSIS — L57.0 ACTINIC KERATOSIS: ICD-10-CM

## 2025-07-24 PROBLEM — D48.5 NEOPLASM OF UNCERTAIN BEHAVIOR OF SKIN: Status: ACTIVE | Noted: 2025-07-24

## 2025-07-24 PROCEDURE — ? PHOTO-DOCUMENTATION

## 2025-07-24 PROCEDURE — ? BIOPSY BY SHAVE METHOD

## 2025-07-24 PROCEDURE — ? COUNSELING

## 2025-07-24 PROCEDURE — ? LIQUID NITROGEN

## 2025-07-24 PROCEDURE — ? MEDICARE ABN

## 2025-07-24 ASSESSMENT — LOCATION SIMPLE DESCRIPTION DERM
LOCATION SIMPLE: CHIN
LOCATION SIMPLE: LEFT CHEEK

## 2025-07-24 ASSESSMENT — LOCATION DETAILED DESCRIPTION DERM
LOCATION DETAILED: LEFT CHIN
LOCATION DETAILED: RIGHT MENTAL CREASE
LOCATION DETAILED: LEFT MEDIAL MANDIBULAR CHEEK

## 2025-07-24 ASSESSMENT — LOCATION ZONE DERM: LOCATION ZONE: FACE

## 2025-07-24 NOTE — PROCEDURE: BIOPSY BY SHAVE METHOD
Detail Level: Detailed
Depth Of Biopsy: dermis
Was A Bandage Applied: Yes
Size Of Lesion In Cm: 0
Biopsy Type: H and E
Biopsy Method: Dermablade
Anesthesia Type: 1% Xylocaine with [id number] epinephrine and sodium bicarbonate
Anesthesia Volume In Cc: 1
Hemostasis: Aluminum Chloride and Electrocautery
Wound Care: Aquaphor
Dressing: Band-Aid
Destruction After The Procedure: No
Type Of Destruction Used: Curettage
Curettage Text: The wound bed was treated with curettage after the biopsy was performed.
Cryotherapy Text: The wound bed was treated with cryotherapy after the biopsy was performed.
Electrodesiccation Text: The wound bed was treated with electrodesiccation after the biopsy was performed.
Electrodesiccation And Curettage Text: The wound bed was treated with electrodesiccation and curettage after the biopsy was performed.
Silver Nitrate Text: The wound bed was treated with silver nitrate after the biopsy was performed.
Lab: [id number]
Lab Facility: [id number]
Medical Necessity Information: It is in your best interest to select a reason for this procedure from the list below. All of these items fulfill various CMS LCD requirements except the new and changing color options.
Consent: Written consent was obtained and risks were reviewed including but not limited to scarring, infection, bleeding, scabbing, incomplete removal, nerve damage and allergy to anesthesia.
Post-Care Instructions: I reviewed with the patient in detail post-care instructions. Patient is to keep the biopsy site dry overnight, and then apply bacitracin twice daily until healed. Patient may apply hydrogen peroxide soaks to remove any crusting.
Notification Instructions: Patient will be notified of biopsy results. However, patient instructed to call the office if not contacted within 2 weeks.
Billing Type: Third-Party Bill
Information: Selecting Yes will display possible errors in your note based on the variables you have selected. This validation is only offered as a suggestion for you. PLEASE NOTE THAT THE VALIDATION TEXT WILL BE REMOVED WHEN YOU FINALIZE YOUR NOTE. IF YOU WANT TO FAX A PRELIMINARY NOTE YOU WILL NEED TO TOGGLE THIS TO 'NO' IF YOU DO NOT WANT IT IN YOUR FAXED NOTE.

## 2025-07-24 NOTE — PROCEDURE: MEDICARE ABN
Reason?: Additional Information
Procedure (Limit To 20 Characters): biopsy
Payment Option: Option 1: Bill Medicare, await for decision on payment.
Detail Level: Detailed
Reason?: non-covered service
Procedure (Limit To 20 Characters): liquid nitrogen

## 2025-07-24 NOTE — PROCEDURE: LIQUID NITROGEN
Render Note In Bullet Format When Appropriate: No
Render Post-Care Instructions In Note?: yes
Duration Of Freeze Thaw-Cycle (Seconds): 5
Consent: The patient's consent was obtained including but not limited to risks of crusting, scabbing, blistering, scarring, darker or lighter pigmentary change, recurrence, incomplete removal and infection.
Post-Care Instructions: I reviewed with the patient in detail post-care instructions. Patient is to wear sunprotection, and avoid picking at any of the treated lesions. Pt may apply Vaseline to crusted or scabbing areas.
Detail Level: Detailed
Number Of Freeze-Thaw Cycles: 1 freeze-thaw cycle